# Patient Record
Sex: FEMALE | Race: BLACK OR AFRICAN AMERICAN | Employment: OTHER | URBAN - METROPOLITAN AREA
[De-identification: names, ages, dates, MRNs, and addresses within clinical notes are randomized per-mention and may not be internally consistent; named-entity substitution may affect disease eponyms.]

---

## 2018-10-24 ENCOUNTER — OFFICE VISIT (OUTPATIENT)
Dept: FAMILY MEDICINE CLINIC | Age: 63
End: 2018-10-24

## 2018-10-24 VITALS
SYSTOLIC BLOOD PRESSURE: 160 MMHG | TEMPERATURE: 98.1 F | RESPIRATION RATE: 18 BRPM | HEART RATE: 56 BPM | DIASTOLIC BLOOD PRESSURE: 81 MMHG | BODY MASS INDEX: 48.18 KG/M2 | OXYGEN SATURATION: 97 % | HEIGHT: 59 IN | WEIGHT: 239 LBS

## 2018-10-24 DIAGNOSIS — Z23 ENCOUNTER FOR IMMUNIZATION: ICD-10-CM

## 2018-10-24 DIAGNOSIS — Z00.00 WELL WOMAN EXAM (NO GYNECOLOGICAL EXAM): ICD-10-CM

## 2018-10-24 DIAGNOSIS — I10 ESSENTIAL HYPERTENSION: ICD-10-CM

## 2018-10-24 DIAGNOSIS — Z76.89 ESTABLISHING CARE WITH NEW DOCTOR, ENCOUNTER FOR: Primary | ICD-10-CM

## 2018-10-24 DIAGNOSIS — E66.01 OBESITY, MORBID (HCC): ICD-10-CM

## 2018-10-24 DIAGNOSIS — M17.0 PRIMARY OSTEOARTHRITIS OF BOTH KNEES: ICD-10-CM

## 2018-10-24 DIAGNOSIS — Z11.59 NEED FOR HEPATITIS C SCREENING TEST: ICD-10-CM

## 2018-10-24 RX ORDER — BISMUTH SUBSALICYLATE 262 MG
1 TABLET,CHEWABLE ORAL DAILY
COMMUNITY

## 2018-10-24 RX ORDER — NYSTATIN AND TRIAMCINOLONE ACETONIDE 100000; 1 [USP'U]/G; MG/G
CREAM TOPICAL
Refills: 2 | COMMUNITY
Start: 2018-10-19 | End: 2019-04-30

## 2018-10-24 RX ORDER — NYSTATIN 100000 [USP'U]/G
POWDER TOPICAL
Refills: 0 | COMMUNITY
Start: 2018-10-12 | End: 2019-04-30

## 2018-10-24 RX ORDER — HYDROCHLOROTHIAZIDE 25 MG/1
25 TABLET ORAL DAILY
Qty: 90 TAB | Refills: 1 | Status: SHIPPED | OUTPATIENT
Start: 2018-10-24 | End: 2019-04-06 | Stop reason: SDUPTHER

## 2018-10-24 NOTE — PROGRESS NOTES
Chief Complaint   Patient presents with    New Patient     establish care    Immunization/Injection     flu, shingles    Other     recent yeast inf. under breast     1. Have you been to the ER, urgent care clinic since your last visit? Hospitalized since your last visit? No    2. Have you seen or consulted any other health care providers outside of the [address] [place] [address] since your last visit? Include any pap smears or colon screening. Yes patient saw OBGYN Oct. 12 for yeast inf.  And had annual exam in Aug

## 2018-10-24 NOTE — PROGRESS NOTES
[facility] Note      Subjective:     Chief Complaint   Patient presents with    New Patient     establish care    Immunization/Injection     flu, shingles    Other     recent yeast inf. under breast     Franny Santos is a 66y.o. year old female who presents for evaluation of the following:    Establishment of Care:  Previous PCP: Dr. Bonnie Driscoll in [place], last seen 2018  Care Team:   Dentist- seen in past 1 year  Optho- seen in past 1 year. [facility] in [place]. GYN- Dr. Laura Nowak., [facility]. Last seen 10/2018  Ortho- [facility] in [address] [place] [address]    PMH:   History of HTN:   Diagnosed 5 years ago  Tx: None  Previous Tx: Diovan, discontinued by MD    History of Asthma Symptoms:   Told later by pulmonologist that she does not have asthma    Obesity:   \"It's too much but better than it was\"  Lost 50 lbs 2 years ago and regained all but 13lbs. Diet: Eating vegetable shake and egg for breakfast, lunch salad with grilled chicken, dinner friend chicken, fried fish, some pasta like spaghetti with ground turkey, mac n cheese,   - eats a lot of cake previously but cut down in past 1 month  Activity:  Walking    OA Knees, bilateral:   Previously getting knee injection   Requests ortho referral   Pain is occasional, not daily  No functional limitation related to knee pain      Acute Concerns:  None      Social:   Works- retired postal   Lives alone. No children  - moved to [place] t be near her niece who is also a patient here  Mood is \"great\"    Health Maintenance:   TDaP: Not on file but patient reports completed at unknown date  Influenza: Due  HPV: Not due  Pneumovax: Not due   Prevnar @65: Not due  Shingles @60: Advised patient request from pharmacy    Colonoscopy @50: Has had once in the past but date unknwon.  Due 2019 per patient  ASA @ 55f and 45m: Not taking  Dexa @65: Not due  HCV for  45-65: Due    HIV or other STD testing: Declined  Domestic Violence Screen: Negative  Depression Screen: Denies depression or anhedonia  PHQ over the last two weeks 10/24/2018   Little interest or pleasure in doing things Not at all   Feeling down, depressed, irritable, or hopeless Not at all   Total Score PHQ 2 0       Smoker? No    G0  Pap:  Last 5/2018, NIL per patient previous GYN in [address] [place] [address]: Last 2017. Records recently sent to GYN in [facility]  No LMP recorded. Patient is postmenopausal.    reports that she does not currently engage in sexual activity. Review of Systems   Pertinent positives and negative per HPI. All other systems  reviewed are negative for a Comprehensive ROS (10+). History reviewed. No pertinent past medical history. Social History     Socioeconomic History    Marital status: SINGLE     Spouse name: Not on file    Number of children: Not on file    Years of education: Not on file    Highest education level: Not on file   Social Needs    Financial resource strain: Not on file    Food insecurity - worry: Not on file    Food insecurity - inability: Not on file    Transportation needs - medical: Not on file   [employer] needs - non-medical: Not on file   Occupational History    Not on file   Tobacco Use    Smoking status: Never Smoker    Smokeless tobacco: Never Used   Substance and Sexual Activity    Alcohol use: Yes     Alcohol/week: 1.2 oz     Types: 1 Glasses of wine, 1 Cans of beer per week     Comment: occasinal    Drug use: No    Sexual activity: Not Currently   Other Topics Concern    Not on file   Social History Narrative    Not on file       Current Outpatient Medications   Medication Sig    nystatin (MYCOSTATIN) powder APPLY TO AFFECTED AREA TWICE A DAY    nystatin-triamcinolone (MYCOLOG II) topical cream APPLY TO AFFECTED AREA TWICE A DAY    multivitamin (ONE A DAY) tablet Take 1 Tab by mouth daily. No current facility-administered medications for this visit. Objective:     Vitals:    10/24/18 1351   BP: 160/81   Pulse: (!) 56   Resp: 18   Temp: 98.1 °F (36.7 °C)   TempSrc: Oral   SpO2: 97%   Weight: 239 lb (108.4 kg)   Height: 4' 11\" (1.499 m)       Physical Examination:  General: Alert, cooperative, no distress, appears stated age. Obese  Eyes: Conjunctivae clear. PERRL, EOMs intact. Ears: Normal external ear canals both ears. TM clear and mobile bilaterally  Nose: Nares normal. Septum midline. Mucosa normal. No drainage or sinus tenderness. Mouth/Throat: Lips, mucosa, and tongue normal.   Neck: Supple, symmetrical, trachea midline, no adenopathy. No thyroid enlargement/tenderness/nodules  Back: Symmetric, no curvature. ROM normal. No CVA tenderness. Lungs: Clear to auscultation bilaterally. Normal inspiratory and expiratory ratio. Heart: Regular rate and rhythm, S1, S2 normal, no murmur, click, rub or gallop. Abdomen: Soft, non-tender. Bowel sounds normal. No masses or organomegaly. Extremities: Extremities normal, atraumatic, no cyanosis or edema. Pulses: 2+ and symmetric all extremities. Skin: Skin color, texture, turgor normal. No rashes or lesions on exposed skin. Lymph nodes: Cervical, supraclavicular nodes normal.  Neurologic: CNII-XII intact. Strength 5/5 grossly. Sensation and reflexes normal throughout. No results found for any previous visit. Assessment/ Plan:   Diagnoses and all orders for this visit:    1. Establishing care with new doctor, encounter for    2. Well woman exam (no gynecological exam)  -     CBC WITH AUTOMATED DIFF  -     METABOLIC PANEL, COMPREHENSIVE  -     LIPID PANEL  -     TSH AND FREE T4  -     HEMOGLOBIN A1C WITH EAG    3. Obesity, morbid (HCC)  -     TSH AND FREE T4  -     HEMOGLOBIN A1C WITH EAG    4. Essential hypertension  -     hydroCHLOROthiazide (HYDRODIURIL) 25 mg tablet; Take 1 Tab by mouth daily. 5. Primary osteoarthritis of both knees  -     REFERRAL TO ORTHOPEDICS    6.  Need for hepatitis C screening test  -     HEPATITIS C AB    7. Encounter for immunization  -     INFLUENZA VIRUS VAC QUAD,SPLIT,PRESV FREE SYRINGE IM  -     DE IMMUNIZ ADMIN,1 SINGLE/COMB VAC/TOXOID  -     varicella zoster vaccine live (VARICELLA-ZOSTER VACINE LIVE) 19,400 unit/0.65 mL susr injection; 1 Vial by SubCUTAneous route once for 1 dose. Other orders  -     CVD REPORT      Previous records requested  Vaccines reviewed, flu shot given. Labs to eval end organ function. Hep C screen  Diet and lifestyle modification encouraged for weighs loss. HTN uncontrolled. Start HCTZ. Low salt diet encouraged. Referral provided as requested to orthopedist for OA knees. Educated patient on red flag symptoms to warrant return to clinic or emergency room visit. I have discussed the diagnosis with the patient and the intended plan as seen in the above orders. The patient has been offered or received an after-visit summary and questions were answered concerning future plans. I have discussed medication side effects and warnings with the patient as well. Follow-up Disposition:  Return in about 3 months (around 1/24/2019) for Follow Up.       Signed,    Amy Solis MD  10/24/2018

## 2018-10-24 NOTE — PATIENT INSTRUCTIONS
Weight Loss Tips:    Remember this is like a part time job so your motivation and commitment is key to your success. Use small plate only  Drink 2 liters (1/2 gallon) of water every day  1/2 of every meal is fruit or vegetable  Try meal prepping on Sunday with new different vegetables. Replace soda with diet soda or other zero sugar drinks (selter water just fine)  Start using the THREAT STREAM sonja for calorie counting. Goal 1800 calories per day  Start work out at least 5 days per week for 40 minutes. Consider [employer] training sonja for home exercises. Well Visit, Women 48 to 72: Care Instructions  Your Care Instructions    Physical exams can help you stay healthy. Your doctor has checked your overall health and may have suggested ways to take good care of yourself. He or she also may have recommended tests. At home, you can help prevent illness with healthy eating, regular exercise, and other steps. Follow-up care is a key part of your treatment and safety. Be sure to make and go to all appointments, and call your doctor if you are having problems. It's also a good idea to know your test results and keep a list of the medicines you take. How can you care for yourself at home? · Reach and stay at a healthy weight. This will lower your risk for many problems, such as obesity, diabetes, heart disease, and high blood pressure. · Get at least 30 minutes of exercise on most days of the week. Walking is a good choice. You also may want to do other activities, such as running, swimming, cycling, or playing tennis or team sports. · Do not smoke. Smoking can make health problems worse. If you need help quitting, talk to your doctor about stop-smoking programs and medicines. These can increase your chances of quitting for good. · Protect your skin from too much sun. When you're outdoors from 10 a.m. to 4 p.m., stay in the shade or cover up with clothing and a hat with a wide brim.  Wear sunglasses that block UV rays. Even when it's cloudy, put broad-spectrum sunscreen (SPF 30 or higher) on any exposed skin. · See a dentist one or two times a year for checkups and to have your teeth cleaned. · Wear a seat belt in the car. · Limit alcohol to 1 drink a day. Too much alcohol can cause health problems. Follow your doctor's advice about when to have certain tests. These tests can spot problems early. · Cholesterol. Your doctor will tell you how often to have this done based on your age, family history, or other things that can increase your risk for heart attack and stroke. · Blood pressure. Have your blood pressure checked during a routine doctor visit. Your doctor will tell you how often to check your blood pressure based on your age, your blood pressure results, and other factors. · Mammogram. Ask your doctor how often you should have a mammogram, which is an X-ray of your breasts. A mammogram can spot breast cancer before it can be felt and when it is easiest to treat. · Pap test and pelvic exam. Ask your doctor how often you should have a Pap test. You may not need to have a Pap test as often as you used to. · Vision. Have your eyes checked every year or two or as often as your doctor suggests. Some experts recommend that you have yearly exams for glaucoma and other age-related eye problems starting at age 48. · Hearing. Tell your doctor if you notice any change in your hearing. You can have tests to find out how well you hear. · Diabetes. Ask your doctor whether you should have tests for diabetes. · Colon cancer. You should begin tests for colon cancer at age 48. You may have one of several tests. Your doctor will tell you how often to have tests based on your age and risk. Risks include whether you already had a precancerous polyp removed from your colon or whether your parents, sisters and brothers, or children have had colon cancer. · Thyroid disease.  Talk to your doctor about whether to have your thyroid checked as part of a regular physical exam. Women have an increased chance of a thyroid problem. · Osteoporosis. You should begin tests for bone density at age 72. If you are younger than 72, ask your doctor whether you have factors that may increase your risk for this disease. You may want to have this test before age 72. · Heart attack and stroke risk. At least every 4 to 6 years, you should have your risk for heart attack and stroke assessed. Your doctor uses factors such as your age, blood pressure, cholesterol, and whether you smoke or have diabetes to show what your risk for a heart attack or stroke is over the next 10 years. When should you call for help? Watch closely for changes in your health, and be sure to contact your doctor if you have any problems or symptoms that concern you. Where can you learn more? Go to http://zach-medina.info/. Enter Z673 in the search box to learn more about \"Well Visit, Women 50 to 72: Care Instructions. \"  Current as of: March 29, 2018  Content Version: 11.8  © 3118-3348 [employer]. Care instructions adapted under license by [place] (which disclaims liability or warranty for this information). If you have questions about a medical condition or this instruction, always ask your healthcare professional. [address] any warranty or liability for your use of this information. DASH Diet: Care Instructions  Your Care Instructions    The DASH diet is an eating plan that can help lower your blood pressure. DASH stands for Dietary Approaches to Stop Hypertension. Hypertension is high blood pressure. The DASH diet focuses on eating foods that are high in calcium, potassium, and magnesium. These nutrients can lower blood pressure. The foods that are highest in these nutrients are fruits, vegetables, low-fat dairy products, nuts, seeds, and legumes.  But taking calcium, potassium, and magnesium supplements instead of eating foods that are high in those nutrients does not have the same effect. The DASH diet also includes whole grains, fish, and poultry. The DASH diet is one of several lifestyle changes your doctor may recommend to lower your high blood pressure. Your doctor may also want you to decrease the amount of sodium in your diet. Lowering sodium while following the DASH diet can lower blood pressure even further than just the DASH diet alone. Follow-up care is a key part of your treatment and safety. Be sure to make and go to all appointments, and call your doctor if you are having problems. It's also a good idea to know your test results and keep a list of the medicines you take. How can you care for yourself at home? Following the DASH diet  · Eat 4 to 5 servings of fruit each day. A serving is 1 medium-sized piece of fruit, ½ cup chopped or canned fruit, 1/4 cup dried fruit, or 4 ounces (½ cup) of fruit juice. Choose fruit more often than fruit juice. · Eat 4 to 5 servings of vegetables each day. A serving is 1 cup of lettuce or raw leafy vegetables, ½ cup of chopped or cooked vegetables, or 4 ounces (½ cup) of vegetable juice. Choose vegetables more often than vegetable juice. · Get 2 to 3 servings of low-fat and fat-free dairy each day. A serving is 8 ounces of milk, 1 cup of yogurt, or 1 ½ ounces of cheese. · Eat 6 to 8 servings of grains each day. A serving is 1 slice of bread, 1 ounce of dry cereal, or ½ cup of cooked rice, pasta, or cooked cereal. Try to choose whole-grain products as much as possible. · Limit lean meat, poultry, and fish to 2 servings each day. A serving is 3 ounces, about the size of a deck of cards. · Eat 4 to 5 servings of nuts, seeds, and legumes (cooked dried beans, lentils, and split peas) each week. A serving is 1/3 cup of nuts, 2 tablespoons of seeds, or ½ cup of cooked beans or peas. · Limit fats and oils to 2 to 3 servings each day.  A serving is 1 teaspoon of vegetable oil or 2 tablespoons of salad dressing. · Limit sweets and added sugars to 5 servings or less a week. A serving is 1 tablespoon jelly or jam, ½ cup sorbet, or 1 cup of lemonade. · Eat less than 2,300 milligrams (mg) of sodium a day. If you limit your sodium to 1,500 mg a day, you can lower your blood pressure even more. Tips for success  · Start small. Do not try to make dramatic changes to your diet all at once. You might feel that you are missing out on your favorite foods and then be more likely to not follow the plan. Make small changes, and stick with them. Once those changes become habit, add a few more changes. · Try some of the following:  ? Make it a goal to eat a fruit or vegetable at every meal and at snacks. This will make it easy to get the recommended amount of fruits and vegetables each day. ? Try yogurt topped with fruit and nuts for a snack or healthy dessert. ? Add lettuce, tomato, cucumber, and onion to sandwiches. ? Combine a ready-made pizza crust with low-fat mozzarella cheese and lots of vegetable toppings. Try using tomatoes, squash, spinach, broccoli, carrots, cauliflower, and onions. ? Have a variety of cut-up vegetables with a low-fat dip as an appetizer instead of chips and dip. ? Sprinkle sunflower seeds or chopped almonds over salads. Or try adding chopped walnuts or almonds to cooked vegetables. ? Try some vegetarian meals using beans and peas. Add garbanzo or kidney beans to salads. Make burritos and tacos with mashed abdalla beans or black beans. Where can you learn more? Go to http://zach-medina.info/. Enter I616 in the search box to learn more about \"DASH Diet: Care Instructions. \"  Current as of: December 6, 2017  Content Version: 11.8  © 3911-2889 [employer]. Care instructions adapted under license by [employer] (which disclaims liability or warranty for this information).  If you have questions about a medical condition or this instruction, always ask your healthcare professional. [place] [address] any warranty or liability for your use of this information.

## 2018-10-25 LAB
ALBUMIN SERPL-MCNC: 4.2 G/DL (ref 3.6–4.8)
ALBUMIN/GLOB SERPL: 1.3 {RATIO} (ref 1.2–2.2)
ALP SERPL-CCNC: 124 IU/L (ref 39–117)
ALT SERPL-CCNC: 14 IU/L (ref 0–32)
AST SERPL-CCNC: 20 IU/L (ref 0–40)
BASOPHILS # BLD AUTO: 0 X10E3/UL (ref 0–0.2)
BASOPHILS NFR BLD AUTO: 0 %
BILIRUB SERPL-MCNC: 0.6 MG/DL (ref 0–1.2)
BUN SERPL-MCNC: 17 MG/DL (ref 8–27)
BUN/CREAT SERPL: 24 (ref 12–28)
CALCIUM SERPL-MCNC: 9.8 MG/DL (ref 8.7–10.3)
CHLORIDE SERPL-SCNC: 99 MMOL/L (ref 96–106)
CHOLEST SERPL-MCNC: 205 MG/DL (ref 100–199)
CO2 SERPL-SCNC: 26 MMOL/L (ref 20–29)
CREAT SERPL-MCNC: 0.7 MG/DL (ref 0.57–1)
EOSINOPHIL # BLD AUTO: 0.1 X10E3/UL (ref 0–0.4)
EOSINOPHIL NFR BLD AUTO: 2 %
ERYTHROCYTE [DISTWIDTH] IN BLOOD BY AUTOMATED COUNT: 14.5 % (ref 12.3–15.4)
EST. AVERAGE GLUCOSE BLD GHB EST-MCNC: 120 MG/DL
GLOBULIN SER CALC-MCNC: 3.2 G/DL (ref 1.5–4.5)
GLUCOSE SERPL-MCNC: 83 MG/DL (ref 65–99)
HBA1C MFR BLD: 5.8 % (ref 4.8–5.6)
HCT VFR BLD AUTO: 37.4 % (ref 34–46.6)
HCV AB S/CO SERPL IA: <0.1 S/CO RATIO (ref 0–0.9)
HDLC SERPL-MCNC: 71 MG/DL
HGB BLD-MCNC: 12.5 G/DL (ref 11.1–15.9)
IMM GRANULOCYTES # BLD: 0 X10E3/UL (ref 0–0.1)
IMM GRANULOCYTES NFR BLD: 0 %
INTERPRETATION, 910389: NORMAL
LDLC SERPL CALC-MCNC: 117 MG/DL (ref 0–99)
LYMPHOCYTES # BLD AUTO: 2.1 X10E3/UL (ref 0.7–3.1)
LYMPHOCYTES NFR BLD AUTO: 37 %
MCH RBC QN AUTO: 31.6 PG (ref 26.6–33)
MCHC RBC AUTO-ENTMCNC: 33.4 G/DL (ref 31.5–35.7)
MCV RBC AUTO: 95 FL (ref 79–97)
MONOCYTES # BLD AUTO: 0.3 X10E3/UL (ref 0.1–0.9)
MONOCYTES NFR BLD AUTO: 5 %
NEUTROPHILS # BLD AUTO: 3.1 X10E3/UL (ref 1.4–7)
NEUTROPHILS NFR BLD AUTO: 56 %
PLATELET # BLD AUTO: 297 X10E3/UL (ref 150–379)
POTASSIUM SERPL-SCNC: 5.1 MMOL/L (ref 3.5–5.2)
PROT SERPL-MCNC: 7.4 G/DL (ref 6–8.5)
RBC # BLD AUTO: 3.95 X10E6/UL (ref 3.77–5.28)
SODIUM SERPL-SCNC: 140 MMOL/L (ref 134–144)
T4 FREE SERPL-MCNC: 1.21 NG/DL (ref 0.82–1.77)
TRIGL SERPL-MCNC: 83 MG/DL (ref 0–149)
TSH SERPL DL<=0.005 MIU/L-ACNC: 1.22 UIU/ML (ref 0.45–4.5)
VLDLC SERPL CALC-MCNC: 17 MG/DL (ref 5–40)
WBC # BLD AUTO: 5.6 X10E3/UL (ref 3.4–10.8)

## 2018-10-29 DIAGNOSIS — E78.5 HYPERLIPIDEMIA, UNSPECIFIED HYPERLIPIDEMIA TYPE: Primary | ICD-10-CM

## 2018-10-29 RX ORDER — GUAIFENESIN 100 MG/5ML
81 LIQUID (ML) ORAL DAILY
Qty: 90 TAB | Refills: 1 | Status: SHIPPED | OUTPATIENT
Start: 2018-10-29 | End: 2019-04-12 | Stop reason: SDUPTHER

## 2018-10-29 RX ORDER — ATORVASTATIN CALCIUM 20 MG/1
TABLET, FILM COATED ORAL
Qty: 90 TAB | Refills: 1 | Status: SHIPPED | OUTPATIENT
Start: 2018-10-29 | End: 2019-04-12 | Stop reason: SDUPTHER

## 2018-10-29 NOTE — PROGRESS NOTES
Outbound call to patient. Name and  verified. Reviewed recent labs with patient. She verbalized understanding. Letter printed with results.

## 2018-10-29 NOTE — PROGRESS NOTES
Notify Patient:  Most of your results are normal. Your cholesterol is higher than normal which increases your risk of heart attack and stroke. I recommend you start a daily aspirin 81 mg and start a cholesterol lowering medicine called atorvastatin. I will send this to your pharmacy. Your blood sugar level is in the PRE-diabetes range. This means you do not have diabetes but you could develop it later on. I would suggest you change your diet to exclude processed foods such as deli meat and hotdogs. You should also avoid bread, crackers, cakes, and cookies. Try replacing them with whole foods, like fruits and vegetables.        MD Note:  ASCVD Risk 11.9%

## 2018-11-06 PROBLEM — E04.2 MULTINODULAR GOITER: Status: ACTIVE | Noted: 2018-11-06

## 2018-11-06 PROBLEM — K29.30 CHRONIC SUPERFICIAL GASTRITIS WITHOUT BLEEDING: Status: ACTIVE | Noted: 2018-11-06

## 2018-11-07 ENCOUNTER — OFFICE VISIT (OUTPATIENT)
Dept: FAMILY MEDICINE CLINIC | Age: 63
End: 2018-11-07

## 2018-11-07 VITALS
OXYGEN SATURATION: 96 % | DIASTOLIC BLOOD PRESSURE: 86 MMHG | HEART RATE: 78 BPM | BODY MASS INDEX: 46.04 KG/M2 | WEIGHT: 228.4 LBS | TEMPERATURE: 98.2 F | HEIGHT: 59 IN | SYSTOLIC BLOOD PRESSURE: 138 MMHG | RESPIRATION RATE: 18 BRPM

## 2018-11-07 DIAGNOSIS — J06.9 VIRAL UPPER RESPIRATORY TRACT INFECTION: ICD-10-CM

## 2018-11-07 DIAGNOSIS — I10 ESSENTIAL HYPERTENSION: Primary | ICD-10-CM

## 2018-11-07 DIAGNOSIS — E04.2 MULTINODULAR GOITER: ICD-10-CM

## 2018-11-07 DIAGNOSIS — M17.0 PRIMARY OSTEOARTHRITIS OF BOTH KNEES: ICD-10-CM

## 2018-11-07 DIAGNOSIS — E66.01 OBESITY, MORBID (HCC): ICD-10-CM

## 2018-11-07 PROBLEM — D25.1 INTRAMURAL LEIOMYOMA OF UTERUS: Status: ACTIVE | Noted: 2018-11-07

## 2018-11-07 NOTE — PATIENT INSTRUCTIONS
For your symptoms: Your symptoms may improve with an oral antihistamine. These are available over the counter and include:  Loratadine/claritin  Cetirizine/Zyrtec  Fexofenadine/Allegra  Levocetirizine/Xyzal    · Your symptoms may improve with a nasal steroid. These are available over the counter and include:  · Flonase (aka fluticasone)  · Nasocort (aka triamcinolone)  · Nasonex (aka mometasone)  · Rhinocort (aka budesonide)    · Increase fluid intake, especially water to thin mucous and boost the immune system. · Avoid sugar and dairy while congested since they thicken mucous. · Get plenty of rest!    · Gargle 3 times daily and as needed in Listerine or warm salt water vinegar solutions (1 tsp salt, 1 tsp vinegar in 1 cup lukewarm water.)    · Use OTC nasal saline spray up each nostril four times daily. You could also consider using a netipot with distilled water. · Use humidifier at bedtime. · Use OTC Mucinex 600 mg twice daily to loosen mucous. · Use OTC Tylenol  (up to 650mg every 6 hours) or Ibuprofen (up to 800 mg every 8 hours) as needed for pain, fever or headaches. ·  Avoid decongestants and Ibuprofen if you have high blood pressure! Return to the doctor for evaluation:  · If mucous is consistently discolored yellow or green throughout the day for more than a week  · If you develop worsening facial pain  · If you develop a fever that will not go away  · If your symptoms worsen instead of improve           DASH Diet: Care Instructions  Your Care Instructions    The DASH diet is an eating plan that can help lower your blood pressure. DASH stands for Dietary Approaches to Stop Hypertension. Hypertension is high blood pressure. The DASH diet focuses on eating foods that are high in calcium, potassium, and magnesium. These nutrients can lower blood pressure. The foods that are highest in these nutrients are fruits, vegetables, low-fat dairy products, nuts, seeds, and legumes.  But taking calcium, potassium, and magnesium supplements instead of eating foods that are high in those nutrients does not have the same effect. The DASH diet also includes whole grains, fish, and poultry. The DASH diet is one of several lifestyle changes your doctor may recommend to lower your high blood pressure. Your doctor may also want you to decrease the amount of sodium in your diet. Lowering sodium while following the DASH diet can lower blood pressure even further than just the DASH diet alone. Follow-up care is a key part of your treatment and safety. Be sure to make and go to all appointments, and call your doctor if you are having problems. It's also a good idea to know your test results and keep a list of the medicines you take. How can you care for yourself at home? Following the DASH diet  · Eat 4 to 5 servings of fruit each day. A serving is 1 medium-sized piece of fruit, ½ cup chopped or canned fruit, 1/4 cup dried fruit, or 4 ounces (½ cup) of fruit juice. Choose fruit more often than fruit juice. · Eat 4 to 5 servings of vegetables each day. A serving is 1 cup of lettuce or raw leafy vegetables, ½ cup of chopped or cooked vegetables, or 4 ounces (½ cup) of vegetable juice. Choose vegetables more often than vegetable juice. · Get 2 to 3 servings of low-fat and fat-free dairy each day. A serving is 8 ounces of milk, 1 cup of yogurt, or 1 ½ ounces of cheese. · Eat 6 to 8 servings of grains each day. A serving is 1 slice of bread, 1 ounce of dry cereal, or ½ cup of cooked rice, pasta, or cooked cereal. Try to choose whole-grain products as much as possible. · Limit lean meat, poultry, and fish to 2 servings each day. A serving is 3 ounces, about the size of a deck of cards. · Eat 4 to 5 servings of nuts, seeds, and legumes (cooked dried beans, lentils, and split peas) each week. A serving is 1/3 cup of nuts, 2 tablespoons of seeds, or ½ cup of cooked beans or peas.   · Limit fats and oils to 2 to 3 servings each day. A serving is 1 teaspoon of vegetable oil or 2 tablespoons of salad dressing. · Limit sweets and added sugars to 5 servings or less a week. A serving is 1 tablespoon jelly or jam, ½ cup sorbet, or 1 cup of lemonade. · Eat less than 2,300 milligrams (mg) of sodium a day. If you limit your sodium to 1,500 mg a day, you can lower your blood pressure even more. Tips for success  · Start small. Do not try to make dramatic changes to your diet all at once. You might feel that you are missing out on your favorite foods and then be more likely to not follow the plan. Make small changes, and stick with them. Once those changes become habit, add a few more changes. · Try some of the following:  ? Make it a goal to eat a fruit or vegetable at every meal and at snacks. This will make it easy to get the recommended amount of fruits and vegetables each day. ? Try yogurt topped with fruit and nuts for a snack or healthy dessert. ? Add lettuce, tomato, cucumber, and onion to sandwiches. ? Combine a ready-made pizza crust with low-fat mozzarella cheese and lots of vegetable toppings. Try using tomatoes, squash, spinach, broccoli, carrots, cauliflower, and onions. ? Have a variety of cut-up vegetables with a low-fat dip as an appetizer instead of chips and dip. ? Sprinkle sunflower seeds or chopped almonds over salads. Or try adding chopped walnuts or almonds to cooked vegetables. ? Try some vegetarian meals using beans and peas. Add garbanzo or kidney beans to salads. Make burritos and tacos with mashed abdalla beans or black beans. Where can you learn more? Go to http://zach-medina.info/. Enter L379 in the search box to learn more about \"DASH Diet: Care Instructions. \"  Current as of: December 6, 2017  Content Version: 11.8  © 0098-1845 [employer].  Care instructions adapted under license by [employer] (which disclaims liability or warranty for this information). If you have questions about a medical condition or this instruction, always ask your healthcare professional. [place] [address] any warranty or liability for your use of this information.

## 2018-11-07 NOTE — PROGRESS NOTES
[facility] Note      Subjective:     Chief Complaint   Patient presents with    Hypertension     Patient was started on new blood pressure medication     Rudolph Damon is a 66y.o. year old female who presents for evaluation of the following:      PMH:   HTN:   Tx: HCTZ 25mg daily  Eating more vegetables, no candy for halloweeen  - not eating chips, eating at sisters house less often  Wt down 9 lbs     Obesity:   \"It's too much but better than it was\"  Previous Attempts:Lost 50 lbs 2 years ago and regained all but 13lbs. Diet: Eating vegetable shakes, more vegetables with meals  Activity:  Walking  Wt 239>228    OA Knees, bilateral:   Tx: ASA 81 daily for ASCVD prevention  Previously Tx: getting knee injection   No functional limitation related to knee pain  Has not seen orthopedists since knee pain improved    Cough:   Dry, stuffy nose  Onset 1 week ago  No rhinorrhea  Endorses history f allergies  Denies sinus congestion, fever, chest pain, shortness of breath. History multinodular Goiter  In 20123 per outside records. TSH/ fT4 WNL in 10/2018      Social:   Works- retired [employer]   Lives alone. No children  - moved to [place] to be near her niece who is also a patient here        Review of Systems   Pertinent positives and negative per HPI. All other systems  reviewed are negative for a Comprehensive ROS (10+). History reviewed. No pertinent past medical history.      Social History     Socioeconomic History    Marital status: SINGLE     Spouse name: Not on file    Number of children: Not on file    Years of education: Not on file    Highest education level: Not on file   Social Needs    Financial resource strain: Not on file    Food insecurity - worry: Not on file    Food insecurity - inability: Not on file    Transportation needs - medical: Not on file   [employer] needs - non-medical: Not on file   Occupational History    Not on file Tobacco Use    Smoking status: Never Smoker    Smokeless tobacco: Never Used   Substance and Sexual Activity    Alcohol use: Yes     Alcohol/week: 1.2 oz     Types: 1 Glasses of wine, 1 Cans of beer per week     Comment: occasinal    Drug use: No    Sexual activity: Not Currently   Other Topics Concern    Not on file   Social History Narrative    Not on file       Current Outpatient Medications   Medication Sig    atorvastatin (LIPITOR) 20 mg tablet Take 1/2 tab daily for 1 week; if tolerated take 1 tab daily.  aspirin 81 mg chewable tablet Take 1 Tab by mouth daily.  multivitamin (ONE A DAY) tablet Take 1 Tab by mouth daily.  hydroCHLOROthiazide (HYDRODIURIL) 25 mg tablet Take 1 Tab by mouth daily.  nystatin (MYCOSTATIN) powder APPLY TO AFFECTED AREA TWICE A DAY    nystatin-triamcinolone (MYCOLOG II) topical cream APPLY TO AFFECTED AREA TWICE A DAY     No current facility-administered medications for this visit. Objective:     Vitals:    11/07/18 0812 11/07/18 0829   BP: 143/78 138/86   Pulse: 78    Resp: 18    Temp: 98.2 °F (36.8 °C)    TempSrc: Oral    SpO2: 96%    Weight: 228 lb 6.4 oz (103.6 kg)    Height: 4' 11\" (1.499 m)        Physical Examination:  General: Alert, cooperative, no distress, appears stated age. Obese  Eyes: Conjunctivae clear. PERRL, EOMs intact. Ears: Normal external ear canals both ears. Nose: Nares normal. Septum midline. Mucosa normal. No drainage or sinus tenderness. Mouth/Throat: Lips, mucosa, and tongue normal.   Neck: Supple, symmetrical, trachea midline, no adenopathy. No thyroid enlargement/tenderness/nodules  Back: Symmetric, no curvature. ROM normal. No CVA tenderness. Lungs: Clear to auscultation bilaterally. Normal inspiratory and expiratory ratio. Heart: Regular rate and rhythm, S1, S2 normal, no murmur, click, rub or gallop. Abdomen: Soft, non-tender. Extremities: Extremities normal, atraumatic, no cyanosis or edema.   Pulses: 2+ and symmetric all extremities. Skin: Skin color, texture, turgor normal. No rashes or lesions on exposed skin. Lymph nodes: Cervical, supraclavicular nodes normal.  Neurologic: CNII-XII intact. Office Visit on 10/24/2018   Component Date Value Ref Range Status    WBC 10/24/2018 5.6  3.4 - 10.8 x10E3/uL Final    RBC 10/24/2018 3.95  3.77 - 5.28 x10E6/uL Final    HGB 10/24/2018 12.5  11.1 - 15.9 g/dL Final    HCT 10/24/2018 37.4  34.0 - 46.6 % Final    MCV 10/24/2018 95  79 - 97 fL Final    MCH 10/24/2018 31.6  26.6 - 33.0 pg Final    MCHC 10/24/2018 33.4  31.5 - 35.7 g/dL Final    RDW 10/24/2018 14.5  12.3 - 15.4 % Final    PLATELET 27/94/6529 510  150 - 379 x10E3/uL Final    NEUTROPHILS 10/24/2018 56  Not Estab. % Final    Lymphocytes 10/24/2018 37  Not Estab. % Final    MONOCYTES 10/24/2018 5  Not Estab. % Final    EOSINOPHILS 10/24/2018 2  Not Estab. % Final    BASOPHILS 10/24/2018 0  Not Estab. % Final    ABS. NEUTROPHILS 10/24/2018 3.1  1.4 - 7.0 x10E3/uL Final    Abs Lymphocytes 10/24/2018 2.1  0.7 - 3.1 x10E3/uL Final    ABS. MONOCYTES 10/24/2018 0.3  0.1 - 0.9 x10E3/uL Final    ABS. EOSINOPHILS 10/24/2018 0.1  0.0 - 0.4 x10E3/uL Final    ABS. BASOPHILS 10/24/2018 0.0  0.0 - 0.2 x10E3/uL Final    IMMATURE GRANULOCYTES 10/24/2018 0  Not Estab. % Final    ABS. IMM. GRANS.  10/24/2018 0.0  0.0 - 0.1 x10E3/uL Final    Glucose 10/24/2018 83  65 - 99 mg/dL Final    BUN 10/24/2018 17  8 - 27 mg/dL Final    Creatinine 10/24/2018 0.70  0.57 - 1.00 mg/dL Final    GFR est non-AA 10/24/2018 94  >59 mL/min/1.73 Final    GFR est AA 10/24/2018 108  >59 mL/min/1.73 Final    BUN/Creatinine ratio 10/24/2018 24  12 - 28 Final    Sodium 10/24/2018 140  134 - 144 mmol/L Final    Potassium 10/24/2018 5.1  3.5 - 5.2 mmol/L Final    Chloride 10/24/2018 99  96 - 106 mmol/L Final    CO2 10/24/2018 26  20 - 29 mmol/L Final    Calcium 10/24/2018 9.8  8.7 - 10.3 mg/dL Final    Protein, total 10/24/2018 7.4  6.0 - 8.5 g/dL Final    Albumin 10/24/2018 4.2  3.6 - 4.8 g/dL Final    GLOBULIN, TOTAL 10/24/2018 3.2  1.5 - 4.5 g/dL Final    A-G Ratio 10/24/2018 1.3  1.2 - 2.2 Final    Bilirubin, total 10/24/2018 0.6  0.0 - 1.2 mg/dL Final    Alk. phosphatase 10/24/2018 124* 39 - 117 IU/L Final    AST (SGOT) 10/24/2018 20  0 - 40 IU/L Final    ALT (SGPT) 10/24/2018 14  0 - 32 IU/L Final    Cholesterol, total 10/24/2018 205* 100 - 199 mg/dL Final    Triglyceride 10/24/2018 83  0 - 149 mg/dL Final    HDL Cholesterol 10/24/2018 71  >39 mg/dL Final    VLDL, calculated 10/24/2018 17  5 - 40 mg/dL Final    LDL, calculated 10/24/2018 117* 0 - 99 mg/dL Final    TSH 10/24/2018 1.220  0.450 - 4.500 uIU/mL Final    T4, Free 10/24/2018 1.21  0.82 - 1.77 ng/dL Final    Hemoglobin A1c 10/24/2018 5.8* 4.8 - 5.6 % Final    Comment:          Prediabetes: 5.7 - 6.4           Diabetes: >6.4           Glycemic control for adults with diabetes: <7.0      Estimated average glucose 10/24/2018 120  mg/dL Final    Hep C Virus Ab 10/24/2018 <0.1  0.0 - 0.9 s/co ratio Final    Comment:                                   Negative:     < 0.8                               Indeterminate: 0.8 - 0.9                                    Positive:     > 0.9   The CDC recommends that a positive HCV antibody result   be followed up with a HCV Nucleic Acid Amplification   test ([id number]).  INTERPRETATION 10/24/2018 Note   Final    Supplemental report is available. Assessment/ Plan:   Diagnoses and all orders for this visit:    1. Essential hypertension    2. Obesity, morbid ([address]    3. Multinodular goiter    4. Primary osteoarthritis of both knees      HTN well controlled. Continue HCTZ. Low salt diet encouraged. Diet and lifestyle modification encouraged for weighs loss. Positive reinforcement provided for 9 pound weight loss  History of multinodular goiter with normal thyroid function testing, asymptomatic.   OA well-controlled with daily low-dose aspirin. Orthopedist referral provided at last visit. Mild URI. No SIRS. Trial of otc meds for symptom relief discussed and listed in patient instructions- nasal steroid + mucinex + antihistamine + sinus rinse + otc analgesia + humidifier prn      Educated patient on red flag symptoms to warrant return to clinic or emergency room visit. I have discussed the diagnosis with the patient and the intended plan as seen in the above orders. The patient has been offered or received an after-visit summary and questions were answered concerning future plans. I have discussed medication side effects and warnings with the patient as well. Follow-up Disposition:  Return in about 3 months (around 2/7/2019) for Follow Up.       Signed,    Hill Villatoro MD  11/7/2018

## 2018-11-07 NOTE — PROGRESS NOTES
Chief Complaint   Patient presents with    Hypertension     Patient was started on new blood pressure medication     1. Have you been to the ER, urgent care clinic since your last visit? Hospitalized since your last visit? No    2. Have you seen or consulted any other health care providers outside of the [address] [place] [address] since your last visit? Include any pap smears or colon screening.  No

## 2019-02-06 ENCOUNTER — OFFICE VISIT (OUTPATIENT)
Dept: FAMILY MEDICINE CLINIC | Age: 64
End: 2019-02-06

## 2019-02-06 VITALS
OXYGEN SATURATION: 96 % | BODY MASS INDEX: 44.76 KG/M2 | WEIGHT: 222 LBS | RESPIRATION RATE: 16 BRPM | HEART RATE: 60 BPM | HEIGHT: 59 IN | DIASTOLIC BLOOD PRESSURE: 74 MMHG | TEMPERATURE: 98.1 F | SYSTOLIC BLOOD PRESSURE: 128 MMHG

## 2019-02-06 DIAGNOSIS — I10 ESSENTIAL HYPERTENSION: Primary | ICD-10-CM

## 2019-02-06 DIAGNOSIS — E66.01 OBESITY, MORBID (HCC): ICD-10-CM

## 2019-02-06 NOTE — PROGRESS NOTES
Chief Complaint   Patient presents with    Hypertension     follow up      1. Have you been to the ER, urgent care clinic since your last visit? Hospitalized since your last visit? No    2. Have you seen or consulted any other health care providers outside of the [address] [place] [address] since your last visit? Include any pap smears or colon screening. Yes When: [facility]  1/29/19 opthomology

## 2019-02-06 NOTE — PATIENT INSTRUCTIONS
Weight Loss Tips:    Remember this is like a part time job so your motivation and commitment is key to your success. Use small plate only  Drink 2 liters (1/2 gallon) of water every day  1/2 of every meal is fruit or vegetable  Try meal prepping on Sunday with new different vegetables. Replace soda with diet soda or other zero sugar drinks (selter water just fine)  Start using the [employer] sonja for calorie counting. Goal 1800 calories per day  Start work out at least 5 days per week for 40 minutes. Consider Sw[employer] training sonja for home exercises. DASH Diet: Care Instructions  Your Care Instructions    The DASH diet is an eating plan that can help lower your blood pressure. DASH stands for Dietary Approaches to Stop Hypertension. Hypertension is high blood pressure. The DASH diet focuses on eating foods that are high in calcium, potassium, and magnesium. These nutrients can lower blood pressure. The foods that are highest in these nutrients are fruits, vegetables, low-fat dairy products, nuts, seeds, and legumes. But taking calcium, potassium, and magnesium supplements instead of eating foods that are high in those nutrients does not have the same effect. The DASH diet also includes whole grains, fish, and poultry. The DASH diet is one of several lifestyle changes your doctor may recommend to lower your high blood pressure. Your doctor may also want you to decrease the amount of sodium in your diet. Lowering sodium while following the DASH diet can lower blood pressure even further than just the DASH diet alone. Follow-up care is a key part of your treatment and safety. Be sure to make and go to all appointments, and call your doctor if you are having problems. It's also a good idea to know your test results and keep a list of the medicines you take. How can you care for yourself at home? Following the DASH diet  · Eat 4 to 5 servings of fruit each day.  A serving is 1 medium-sized piece of fruit, ½ cup chopped or canned fruit, 1/4 cup dried fruit, or 4 ounces (½ cup) of fruit juice. Choose fruit more often than fruit juice. · Eat 4 to 5 servings of vegetables each day. A serving is 1 cup of lettuce or raw leafy vegetables, ½ cup of chopped or cooked vegetables, or 4 ounces (½ cup) of vegetable juice. Choose vegetables more often than vegetable juice. · Get 2 to 3 servings of low-fat and fat-free dairy each day. A serving is 8 ounces of milk, 1 cup of yogurt, or 1 ½ ounces of cheese. · Eat 6 to 8 servings of grains each day. A serving is 1 slice of bread, 1 ounce of dry cereal, or ½ cup of cooked rice, pasta, or cooked cereal. Try to choose whole-grain products as much as possible. · Limit lean meat, poultry, and fish to 2 servings each day. A serving is 3 ounces, about the size of a deck of cards. · Eat 4 to 5 servings of nuts, seeds, and legumes (cooked dried beans, lentils, and split peas) each week. A serving is 1/3 cup of nuts, 2 tablespoons of seeds, or ½ cup of cooked beans or peas. · Limit fats and oils to 2 to 3 servings each day. A serving is 1 teaspoon of vegetable oil or 2 tablespoons of salad dressing. · Limit sweets and added sugars to 5 servings or less a week. A serving is 1 tablespoon jelly or jam, ½ cup sorbet, or 1 cup of lemonade. · Eat less than 2,300 milligrams (mg) of sodium a day. If you limit your sodium to 1,500 mg a day, you can lower your blood pressure even more. Tips for success  · Start small. Do not try to make dramatic changes to your diet all at once. You might feel that you are missing out on your favorite foods and then be more likely to not follow the plan. Make small changes, and stick with them. Once those changes become habit, add a few more changes. · Try some of the following:  ? Make it a goal to eat a fruit or vegetable at every meal and at snacks. This will make it easy to get the recommended amount of fruits and vegetables each day.   ? Try yogurt topped with fruit and nuts for a snack or healthy dessert. ? Add lettuce, tomato, cucumber, and onion to sandwiches. ? Combine a ready-made pizza crust with low-fat mozzarella cheese and lots of vegetable toppings. Try using tomatoes, squash, spinach, broccoli, carrots, cauliflower, and onions. ? Have a variety of cut-up vegetables with a low-fat dip as an appetizer instead of chips and dip. ? Sprinkle sunflower seeds or chopped almonds over salads. Or try adding chopped walnuts or almonds to cooked vegetables. ? Try some vegetarian meals using beans and peas. Add garbanzo or kidney beans to salads. Make burritos and tacos with mashed abdalla beans or black beans. Where can you learn more? Go to http://zach-medina.info/. Enter I371 in the search box to learn more about \"DASH Diet: Care Instructions. \"  Current as of: July 22, 2018  Content Version: 11.9  © 0485-8083 [employer]. Care instructions adapted under license by [employer] (which disclaims liability or warranty for this information). If you have questions about a medical condition or this instruction, always ask your healthcare professional. [address] any warranty or liability for your use of this information.

## 2019-02-06 NOTE — PROGRESS NOTES
[facility] Note      Subjective:     Chief Complaint   Patient presents with    Hypertension     follow up      Christianne Garsia is a 66y.o. year old female who presents for evaluation of the following:      PMH:   HTN:   Tx: HCTZ 25mg daily  No home monitoring. Eating more vegetables  - not eating chips, eating at sisters house less often  Wt down another 6 lbs     Obesity:   BMI 44  Previous Attempts:Lost 50 lbs 2 years ago and regained all but 13lbs. Diet: Eating vegetable shakes, more vegetables with meals  Activity:  Walking  Wt 132>803>714       Social:   Works- retired postal   Lives alone. No children  - moved to [place] to be near her niece who is also a patient here        Review of Systems   Pertinent positives and negative per HPI. All other systems  reviewed are negative for a Comprehensive ROS (10+). History reviewed. No pertinent past medical history. Social History     Socioeconomic History    Marital status: SINGLE     Spouse name: Not on file    Number of children: Not on file    Years of education: Not on file    Highest education level: Not on file   Social Needs    Financial resource strain: Not on file    Food insecurity - worry: Not on file    Food insecurity - inability: Not on file    Transportation needs - medical: Not on file   [employer] needs - non-medical: Not on file   Occupational History    Not on file   Tobacco Use    Smoking status: Never Smoker    Smokeless tobacco: Never Used   Substance and Sexual Activity    Alcohol use:  Yes     Alcohol/week: 1.2 oz     Types: 1 Glasses of wine, 1 Cans of beer per week     Comment: occasinal    Drug use: No    Sexual activity: Not Currently   Other Topics Concern    Not on file   Social History Narrative    Not on file       Current Outpatient Medications   Medication Sig    atorvastatin (LIPITOR) 20 mg tablet Take 1/2 tab daily for 1 week; if tolerated take 1 tab daily.  aspirin 81 mg chewable tablet Take 1 Tab by mouth daily.  multivitamin (ONE A DAY) tablet Take 1 Tab by mouth daily.  hydroCHLOROthiazide (HYDRODIURIL) 25 mg tablet Take 1 Tab by mouth daily.  nystatin (MYCOSTATIN) powder APPLY TO AFFECTED AREA TWICE A DAY    nystatin-triamcinolone (MYCOLOG II) topical cream APPLY TO AFFECTED AREA TWICE A DAY     No current facility-administered medications for this visit. Objective:     Vitals:    02/06/19 0807 02/06/19 0811   BP:  128/74   Pulse:  60   Resp:  16   Temp:  98.1 °F (36.7 °C)   TempSrc:  Oral   SpO2:  96%   Weight: 222 lb (100.7 kg)    Height: 4' 11\" (1.499 m)        Physical Examination:  General: Alert, cooperative, no distress, appears stated age. Obese  Eyes: Conjunctivae clear. Ears: Normal external ear canals both ears. Nose: Nares normal.   Mouth/Throat: Lips, mucosa, and tongue normal.   Neck: Supple, symmetrical, trachea midline, no adenopathy. No thyroid enlargement/tenderness/nodules  Back: Symmetric, no curvature. Lungs: Clear to auscultation bilaterally. Normal inspiratory and expiratory ratio. Heart: Regular rate and rhythm, S1, S2 normal, no murmur, click, rub or gallop. Abdomen: Soft, non-tender. Extremities: Extremities normal, atraumatic, no cyanosis or edema. Skin: Skin color, texture, turgor normal. No rashes or lesions on exposed skin. Lymph nodes: Cervical, supraclavicular nodes normal.  Neurologic: CNII-XII intact. No visits with results within 3 Month(s) from this visit.    Latest known visit with results is:   Office Visit on 10/24/2018   Component Date Value Ref Range Status    WBC 10/24/2018 5.6  3.4 - 10.8 x10E3/uL Final    RBC 10/24/2018 3.95  3.77 - 5.28 x10E6/uL Final    HGB 10/24/2018 12.5  11.1 - 15.9 g/dL Final    HCT 10/24/2018 37.4  34.0 - 46.6 % Final    MCV 10/24/2018 95  79 - 97 fL Final    MCH 10/24/2018 31.6  26.6 - 33.0 pg Final    MCHC 10/24/2018 33.4  31.5 - 35.7 g/dL Final    RDW 10/24/2018 14.5  12.3 - 15.4 % Final    PLATELET 65/43/4046 393  150 - 379 x10E3/uL Final    NEUTROPHILS 10/24/2018 56  Not Estab. % Final    Lymphocytes 10/24/2018 37  Not Estab. % Final    MONOCYTES 10/24/2018 5  Not Estab. % Final    EOSINOPHILS 10/24/2018 2  Not Estab. % Final    BASOPHILS 10/24/2018 0  Not Estab. % Final    ABS. NEUTROPHILS 10/24/2018 3.1  1.4 - 7.0 x10E3/uL Final    Abs Lymphocytes 10/24/2018 2.1  0.7 - 3.1 x10E3/uL Final    ABS. MONOCYTES 10/24/2018 0.3  0.1 - 0.9 x10E3/uL Final    ABS. EOSINOPHILS 10/24/2018 0.1  0.0 - 0.4 x10E3/uL Final    ABS. BASOPHILS 10/24/2018 0.0  0.0 - 0.2 x10E3/uL Final    IMMATURE GRANULOCYTES 10/24/2018 0  Not Estab. % Final    ABS. IMM. GRANS. 10/24/2018 0.0  0.0 - 0.1 x10E3/uL Final    Glucose 10/24/2018 83  65 - 99 mg/dL Final    BUN 10/24/2018 17  8 - 27 mg/dL Final    Creatinine 10/24/2018 0.70  0.57 - 1.00 mg/dL Final    GFR est non-AA 10/24/2018 94  >59 mL/min/1.73 Final    GFR est AA 10/24/2018 108  >59 mL/min/1.73 Final    BUN/Creatinine ratio 10/24/2018 24  12 - 28 Final    Sodium 10/24/2018 140  134 - 144 mmol/L Final    Potassium 10/24/2018 5.1  3.5 - 5.2 mmol/L Final    Chloride 10/24/2018 99  96 - 106 mmol/L Final    CO2 10/24/2018 26  20 - 29 mmol/L Final    Calcium 10/24/2018 9.8  8.7 - 10.3 mg/dL Final    Protein, total 10/24/2018 7.4  6.0 - 8.5 g/dL Final    Albumin 10/24/2018 4.2  3.6 - 4.8 g/dL Final    GLOBULIN, TOTAL 10/24/2018 3.2  1.5 - 4.5 g/dL Final    A-G Ratio 10/24/2018 1.3  1.2 - 2.2 Final    Bilirubin, total 10/24/2018 0.6  0.0 - 1.2 mg/dL Final    Alk.  phosphatase 10/24/2018 124* 39 - 117 IU/L Final    AST (SGOT) 10/24/2018 20  0 - 40 IU/L Final    ALT (SGPT) 10/24/2018 14  0 - 32 IU/L Final    Cholesterol, total 10/24/2018 205* 100 - 199 mg/dL Final    Triglyceride 10/24/2018 83  0 - 149 mg/dL Final    HDL Cholesterol 10/24/2018 71  >39 mg/dL Final    VLDL, calculated 10/24/2018 17 5 - 40 mg/dL Final    LDL, calculated 10/24/2018 117* 0 - 99 mg/dL Final    TSH 10/24/2018 1.220  0.450 - 4.500 uIU/mL Final    T4, Free 10/24/2018 1.21  0.82 - 1.77 ng/dL Final    Hemoglobin A1c 10/24/2018 5.8* 4.8 - 5.6 % Final    Comment:          Prediabetes: 5.7 - 6.4           Diabetes: >6.4           Glycemic control for adults with diabetes: <7.0      Estimated average glucose 10/24/2018 120  mg/dL Final    Hep C Virus Ab 10/24/2018 <0.1  0.0 - 0.9 s/co ratio Final    Comment:                                   Negative:     < 0.8                               Indeterminate: 0.8 - 0.9                                    Positive:     > 0.9   The CDC recommends that a positive HCV antibody result   be followed up with a HCV Nucleic Acid Amplification   test (880568).  INTERPRETATION 10/24/2018 Note   Final    Supplemental report is available. Assessment/ Plan:   Diagnoses and all orders for this visit:    1. Essential hypertension    2. Obesity, morbid ([address]          HTN well controlled. Continue HCTZ. Low salt diet encouraged. Diet and lifestyle modification encouraged for weighs loss. Positive reinforcement provided for 9 pound weight loss      I have discussed the diagnosis with the patient and the intended plan as seen in the above orders. The patient has been offered or received an after-visit summary and questions were answered concerning future plans. I have discussed medication side effects and warnings with the patient as well. Follow-up Disposition:  Return in about 3 months (around 5/6/2019) for Follow Up HTN, weight.       Signed,    Noelle Chen MD  2/6/2019

## 2019-04-06 DIAGNOSIS — I10 ESSENTIAL HYPERTENSION: ICD-10-CM

## 2019-04-08 RX ORDER — HYDROCHLOROTHIAZIDE 25 MG/1
TABLET ORAL
Qty: 90 TAB | Refills: 1 | Status: SHIPPED | OUTPATIENT
Start: 2019-04-08 | End: 2019-10-11 | Stop reason: SDUPTHER

## 2019-04-12 DIAGNOSIS — E78.5 HYPERLIPIDEMIA, UNSPECIFIED HYPERLIPIDEMIA TYPE: ICD-10-CM

## 2019-04-15 RX ORDER — GUAIFENESIN 100 MG/5ML
LIQUID (ML) ORAL
Qty: 90 TAB | Refills: 1 | Status: SHIPPED | OUTPATIENT
Start: 2019-04-15 | End: 2019-10-11 | Stop reason: SDUPTHER

## 2019-04-15 RX ORDER — ATORVASTATIN CALCIUM 20 MG/1
TABLET, FILM COATED ORAL
Qty: 90 TAB | Refills: 1 | Status: SHIPPED | OUTPATIENT
Start: 2019-04-15 | End: 2019-10-11 | Stop reason: SDUPTHER

## 2019-04-30 ENCOUNTER — TELEPHONE (OUTPATIENT)
Dept: FAMILY MEDICINE CLINIC | Age: 64
End: 2019-04-30

## 2019-04-30 ENCOUNTER — OFFICE VISIT (OUTPATIENT)
Dept: FAMILY MEDICINE CLINIC | Age: 64
End: 2019-04-30

## 2019-04-30 VITALS
BODY MASS INDEX: 45.4 KG/M2 | HEART RATE: 61 BPM | RESPIRATION RATE: 17 BRPM | TEMPERATURE: 97.8 F | OXYGEN SATURATION: 100 % | DIASTOLIC BLOOD PRESSURE: 81 MMHG | HEIGHT: 59 IN | SYSTOLIC BLOOD PRESSURE: 137 MMHG | WEIGHT: 225.2 LBS

## 2019-04-30 DIAGNOSIS — E78.49 OTHER HYPERLIPIDEMIA: ICD-10-CM

## 2019-04-30 DIAGNOSIS — I10 ESSENTIAL HYPERTENSION: Primary | ICD-10-CM

## 2019-04-30 DIAGNOSIS — E66.01 OBESITY, MORBID (HCC): ICD-10-CM

## 2019-04-30 NOTE — TELEPHONE ENCOUNTER
Castro from [facility] is calling to have an auth sheet sent to them with the patient signature on the form so that they can proceed with the process.        Fax# [phone] to send back forms

## 2019-04-30 NOTE — PATIENT INSTRUCTIONS
Weight Loss Tips:    Remember this is like a part time job so your motivation and commitment is key to your success. Use small plate only  Drink 2 liters (1/2 gallon) of water every day  1/2 of every meal is fruit or vegetable  Try meal prepping on Sunday with new different vegetables. Replace soda with diet soda or other zero sugar drinks (selter water just fine)  Start using the [employer] sonja for calorie counting. Goal 1800 calories per day  Start work out at least 5 days per week for 40 minutes. Consider [employer] training sonja for home exercises. DASH Diet: Care Instructions  Your Care Instructions    The DASH diet is an eating plan that can help lower your blood pressure. DASH stands for Dietary Approaches to Stop Hypertension. Hypertension is high blood pressure. The DASH diet focuses on eating foods that are high in calcium, potassium, and magnesium. These nutrients can lower blood pressure. The foods that are highest in these nutrients are fruits, vegetables, low-fat dairy products, nuts, seeds, and legumes. But taking calcium, potassium, and magnesium supplements instead of eating foods that are high in those nutrients does not have the same effect. The DASH diet also includes whole grains, fish, and poultry. The DASH diet is one of several lifestyle changes your doctor may recommend to lower your high blood pressure. Your doctor may also want you to decrease the amount of sodium in your diet. Lowering sodium while following the DASH diet can lower blood pressure even further than just the DASH diet alone. Follow-up care is a key part of your treatment and safety. Be sure to make and go to all appointments, and call your doctor if you are having problems. It's also a good idea to know your test results and keep a list of the medicines you take. How can you care for yourself at home? Following the DASH diet  · Eat 4 to 5 servings of fruit each day.  A serving is 1 medium-sized piece of fruit, ½ cup chopped or canned fruit, 1/4 cup dried fruit, or 4 ounces (½ cup) of fruit juice. Choose fruit more often than fruit juice. · Eat 4 to 5 servings of vegetables each day. A serving is 1 cup of lettuce or raw leafy vegetables, ½ cup of chopped or cooked vegetables, or 4 ounces (½ cup) of vegetable juice. Choose vegetables more often than vegetable juice. · Get 2 to 3 servings of low-fat and fat-free dairy each day. A serving is 8 ounces of milk, 1 cup of yogurt, or 1 ½ ounces of cheese. · Eat 6 to 8 servings of grains each day. A serving is 1 slice of bread, 1 ounce of dry cereal, or ½ cup of cooked rice, pasta, or cooked cereal. Try to choose whole-grain products as much as possible. · Limit lean meat, poultry, and fish to 2 servings each day. A serving is 3 ounces, about the size of a deck of cards. · Eat 4 to 5 servings of nuts, seeds, and legumes (cooked dried beans, lentils, and split peas) each week. A serving is 1/3 cup of nuts, 2 tablespoons of seeds, or ½ cup of cooked beans or peas. · Limit fats and oils to 2 to 3 servings each day. A serving is 1 teaspoon of vegetable oil or 2 tablespoons of salad dressing. · Limit sweets and added sugars to 5 servings or less a week. A serving is 1 tablespoon jelly or jam, ½ cup sorbet, or 1 cup of lemonade. · Eat less than 2,300 milligrams (mg) of sodium a day. If you limit your sodium to 1,500 mg a day, you can lower your blood pressure even more. Tips for success  · Start small. Do not try to make dramatic changes to your diet all at once. You might feel that you are missing out on your favorite foods and then be more likely to not follow the plan. Make small changes, and stick with them. Once those changes become habit, add a few more changes. · Try some of the following:  ? Make it a goal to eat a fruit or vegetable at every meal and at snacks. This will make it easy to get the recommended amount of fruits and vegetables each day.   ? Try yogurt topped with fruit and nuts for a snack or healthy dessert. ? Add lettuce, tomato, cucumber, and onion to sandwiches. ? Combine a ready-made pizza crust with low-fat mozzarella cheese and lots of vegetable toppings. Try using tomatoes, squash, spinach, broccoli, carrots, cauliflower, and onions. ? Have a variety of cut-up vegetables with a low-fat dip as an appetizer instead of chips and dip. ? Sprinkle sunflower seeds or chopped almonds over salads. Or try adding chopped walnuts or almonds to cooked vegetables. ? Try some vegetarian meals using beans and peas. Add garbanzo or kidney beans to salads. Make burritos and tacos with mashed abdalla beans or black beans. Where can you learn more? Go to http://zahc-medina.info/. Enter D031 in the search box to learn more about \"DASH Diet: Care Instructions. \"  Current as of: July 22, 2018  Content Version: 11.9  © 2567-0182 [employer]. Care instructions adapted under license by [employer] (which disclaims liability or warranty for this information). If you have questions about a medical condition or this instruction, always ask your healthcare professional. [address] any warranty or liability for your use of this information.

## 2019-04-30 NOTE — PROGRESS NOTES
[facility] [facility] Note      Subjective:     Chief Complaint   Patient presents with    Hypertension     3 month follow up    Weight Management     3 month follow up      Fay Garnett is a 66y.o. year old female who presents for evaluation of the following:      PMH:   HTN:   Tx: HCTZ 25mg daily  No home monitoring. Eating more vegetables  - not eating chips, eating at sisters house less often  Wt down another 6 lbs     Obesity:   Body mass index is 45.48 kg/m². Previous Attempts:Lost 50 lbs 2 years ago and regained all but 13lbs. Diet: Eating more bread and cake recently  Activity:  Walking  Wt 239>228>222>225    HLD:   Tx: atorvastatin  ASCVD Risk 11.9% in 10/2018    Social:   Works- retired postal   Lives alone. No children  - moved to [place] to be near her niece who is also a patient here        Review of Systems   Pertinent positives and negative per HPI. All other systems  reviewed are negative for a Comprehensive ROS (10+). History reviewed. No pertinent past medical history. Social History     Socioeconomic History    Marital status: SINGLE     Spouse name: Not on file    Number of children: Not on file    Years of education: Not on file    Highest education level: Not on file   Occupational History    Not on file   Social Needs    Financial resource strain: Not on file    Food insecurity:     Worry: Not on file     Inability: Not on file    Transportation needs:     Medical: Not on file     Non-medical: Not on file   Tobacco Use    Smoking status: Never Smoker    Smokeless tobacco: Never Used   Substance and Sexual Activity    Alcohol use:  Yes     Alcohol/week: 1.2 oz     Types: 1 Glasses of wine, 1 Cans of beer per week     Comment: occasinal    Drug use: No    Sexual activity: Not Currently   Lifestyle    Physical activity:     Days per week: Not on file     Minutes per session: Not on file    Stress: Not on file   Relationships    Social connections:     Talks on phone: Not on file     Gets together: Not on file     Attends Gnosticist service: Not on file     Active member of club or organization: Not on file     Attends meetings of clubs or organizations: Not on file     Relationship status: Not on file    Intimate partner violence:     Fear of current or ex partner: Not on file     Emotionally abused: Not on file     Physically abused: Not on file     Forced sexual activity: Not on file   Other Topics Concern    Not on file   Social History Narrative    Not on file       Current Outpatient Medications   Medication Sig    atorvastatin (LIPITOR) 20 mg tablet TAKE 1/2 TAB DAILY FOR 1 WEEK IF TOLERATED TAKE 1 TAB DAILY    aspirin 81 mg chewable tablet TAKE 1 TABLET BY MOUTH EVERY DAY    hydroCHLOROthiazide (HYDRODIURIL) 25 mg tablet TAKE 1 TABLET BY MOUTH EVERY DAY    multivitamin (ONE A DAY) tablet Take 1 Tab by mouth daily.  nystatin (MYCOSTATIN) powder APPLY TO AFFECTED AREA TWICE A DAY    nystatin-triamcinolone (MYCOLOG II) topical cream APPLY TO AFFECTED AREA TWICE A DAY     No current facility-administered medications for this visit. Objective:     Vitals:    04/30/19 1145   BP: 137/81   Pulse: 61   Resp: 17   Temp: 97.8 °F (36.6 °C)   TempSrc: Oral   SpO2: 100%   Weight: 225 lb 3.2 oz (102.2 kg)   Height: 4' 11\" (1.499 m)       Physical Examination:  General: Alert, cooperative, no distress, appears stated age. Obese  Eyes: Conjunctivae clear. Ears: Normal external ear canals both ears. Nose: Nares normal.   Mouth/Throat: Lips, mucosa, and tongue normal.   Neck: Supple, symmetrical, trachea midline, no adenopathy. No thyroid enlargement/tenderness/nodules  Back: Symmetric, no curvature. Lungs: Clear to auscultation bilaterally. Normal inspiratory and expiratory ratio. Heart: Regular rate and rhythm, S1, S2 normal, no murmur, click, rub or gallop. Abdomen: Soft, non-tender, non distended.  Normal bowel sounds  Extremities: Extremities normal, atraumatic, no cyanosis or edema. Skin: Skin color, texture, turgor normal. No rashes or lesions on exposed skin. Lymph nodes: Cervical, supraclavicular nodes normal.  Neurologic: CNII-XII intact. No visits with results within 3 Month(s) from this visit. Latest known visit with results is:   Office Visit on 10/24/2018   Component Date Value Ref Range Status    WBC 10/24/2018 5.6  3.4 - 10.8 x10E3/uL Final    RBC 10/24/2018 3.95  3.77 - 5.28 x10E6/uL Final    HGB 10/24/2018 12.5  11.1 - 15.9 g/dL Final    HCT 10/24/2018 37.4  34.0 - 46.6 % Final    MCV 10/24/2018 95  79 - 97 fL Final    MCH 10/24/2018 31.6  26.6 - 33.0 pg Final    MCHC 10/24/2018 33.4  31.5 - 35.7 g/dL Final    RDW 10/24/2018 14.5  12.3 - 15.4 % Final    PLATELET 93/61/6952 009  150 - 379 x10E3/uL Final    NEUTROPHILS 10/24/2018 56  Not Estab. % Final    Lymphocytes 10/24/2018 37  Not Estab. % Final    MONOCYTES 10/24/2018 5  Not Estab. % Final    EOSINOPHILS 10/24/2018 2  Not Estab. % Final    BASOPHILS 10/24/2018 0  Not Estab. % Final    ABS. NEUTROPHILS 10/24/2018 3.1  1.4 - 7.0 x10E3/uL Final    Abs Lymphocytes 10/24/2018 2.1  0.7 - 3.1 x10E3/uL Final    ABS. MONOCYTES 10/24/2018 0.3  0.1 - 0.9 x10E3/uL Final    ABS. EOSINOPHILS 10/24/2018 0.1  0.0 - 0.4 x10E3/uL Final    ABS. BASOPHILS 10/24/2018 0.0  0.0 - 0.2 x10E3/uL Final    IMMATURE GRANULOCYTES 10/24/2018 0  Not Estab. % Final    ABS. IMM. GRANS.  10/24/2018 0.0  0.0 - 0.1 x10E3/uL Final    Glucose 10/24/2018 83  65 - 99 mg/dL Final    BUN 10/24/2018 17  8 - 27 mg/dL Final    Creatinine 10/24/2018 0.70  0.57 - 1.00 mg/dL Final    GFR est non-AA 10/24/2018 94  >59 mL/min/1.73 Final    GFR est AA 10/24/2018 108  >59 mL/min/1.73 Final    BUN/Creatinine ratio 10/24/2018 24  12 - 28 Final    Sodium 10/24/2018 140  134 - 144 mmol/L Final    Potassium 10/24/2018 5.1  3.5 - 5.2 mmol/L Final    Chloride 10/24/2018 99  96 - 106 mmol/L Final    CO2 10/24/2018 26  20 - 29 mmol/L Final    Calcium 10/24/2018 9.8  8.7 - 10.3 mg/dL Final    Protein, total 10/24/2018 7.4  6.0 - 8.5 g/dL Final    Albumin 10/24/2018 4.2  3.6 - 4.8 g/dL Final    GLOBULIN, TOTAL 10/24/2018 3.2  1.5 - 4.5 g/dL Final    A-G Ratio 10/24/2018 1.3  1.2 - 2.2 Final    Bilirubin, total 10/24/2018 0.6  0.0 - 1.2 mg/dL Final    Alk. phosphatase 10/24/2018 124* 39 - 117 IU/L Final    AST (SGOT) 10/24/2018 20  0 - 40 IU/L Final    ALT (SGPT) 10/24/2018 14  0 - 32 IU/L Final    Cholesterol, total 10/24/2018 205* 100 - 199 mg/dL Final    Triglyceride 10/24/2018 83  0 - 149 mg/dL Final    HDL Cholesterol 10/24/2018 71  >39 mg/dL Final    VLDL, calculated 10/24/2018 17  5 - 40 mg/dL Final    LDL, calculated 10/24/2018 117* 0 - 99 mg/dL Final    TSH 10/24/2018 1.220  0.450 - 4.500 uIU/mL Final    T4, Free 10/24/2018 1.21  0.82 - 1.77 ng/dL Final    Hemoglobin A1c 10/24/2018 5.8* 4.8 - 5.6 % Final    Comment:          Prediabetes: 5.7 - 6.4           Diabetes: >6.4           Glycemic control for adults with diabetes: <7.0      Estimated average glucose 10/24/2018 120  mg/dL Final    Hep C Virus Ab 10/24/2018 <0.1  0.0 - 0.9 s/co ratio Final    Comment:                                   Negative:     < 0.8                               Indeterminate: 0.8 - 0.9                                    Positive:     > 0.9   The CDC recommends that a positive HCV antibody result   be followed up with a HCV Nucleic Acid Amplification   test ([id number]).  INTERPRETATION 10/24/2018 Note   Final    Supplemental report is available. Assessment/ Plan:   Diagnoses and all orders for this visit:    1. Essential hypertension    2. Other hyperlipidemia  -     LIPID PANEL    3. Obesity, morbid ([address]          HTN well controlled. Continue HCTZ. Low salt diet encouraged. HLD. Continue statin. Lab to monitor.    Diet and lifestyle modification encouraged for weighs loss.      I have discussed the diagnosis with the patient and the intended plan as seen in the above orders. The patient has been offered or received an after-visit summary and questions were answered concerning future plans. I have discussed medication side effects and warnings with the patient as well. Follow-up and Dispositions    · Return in about 3 months (around 7/30/2019) for Follow Up.             Signed,    Jhoan Aldana MD  4/30/2019

## 2019-04-30 NOTE — PROGRESS NOTES
Chief Complaint   Patient presents with    Hypertension     3 month follow up    Weight Management     3 month follow up      1. Have you been to the ER, urgent care clinic since your last visit? Hospitalized since your last visit? No    2. Have you seen or consulted any other health care providers outside of the [address] [place] [address] since your last visit? Include any pap smears or colon screening.  No    Most recent mammogram and colonoscopy requested

## 2019-05-01 LAB
CHOLEST SERPL-MCNC: 146 MG/DL (ref 100–199)
HDLC SERPL-MCNC: 69 MG/DL
INTERPRETATION, 910389: NORMAL
LDLC SERPL CALC-MCNC: 64 MG/DL (ref 0–99)
TRIGL SERPL-MCNC: 64 MG/DL (ref 0–149)
VLDLC SERPL CALC-MCNC: 13 MG/DL (ref 5–40)

## 2019-05-01 NOTE — TELEPHONE ENCOUNTER
Call placed to [facility]. Patients name and  verified. They stated to release colonoscopy results they need a signed release from patient.

## 2019-05-06 NOTE — PROGRESS NOTES
Outbound call to patient. No answer left message to return call regarding labs. Advise results are available on my chart. Letter printed with results.

## 2019-05-24 ENCOUNTER — OFFICE VISIT (OUTPATIENT)
Dept: FAMILY MEDICINE CLINIC | Age: 64
End: 2019-05-24

## 2019-05-24 VITALS
RESPIRATION RATE: 16 BRPM | HEIGHT: 59 IN | TEMPERATURE: 98.4 F | SYSTOLIC BLOOD PRESSURE: 140 MMHG | DIASTOLIC BLOOD PRESSURE: 69 MMHG | WEIGHT: 230 LBS | BODY MASS INDEX: 46.37 KG/M2 | OXYGEN SATURATION: 97 % | HEART RATE: 68 BPM

## 2019-05-24 DIAGNOSIS — H43.391 VITREOUS FLOATERS OF RIGHT EYE: Primary | ICD-10-CM

## 2019-05-24 NOTE — PROGRESS NOTES
Assessment/Plan:     Diagnoses and all orders for this visit:    1. Vitreous floaters of right eye    Discussed the benign nature of floaters. She will follow-up with ophthalmology if she has any additional concerns. She may discontinue Visine eyedrops but as that is not a recommended treatment for vitreous floaters. She will return sooner if symptoms worsen or she develops concerning signs including blurred vision or blindness. Follow-up and Dispositions    · Return if symptoms worsen or fail to improve. Discussed expected course/resolution/complications of diagnosis in detail with patient.    Medication risks/benefits/costs/interactions/alternatives discussed with patient.    Pt was given after visit summary which includes diagnoses, current medications & vitals. Pt expressed understanding with the diagnosis and plan          Subjective:      Magalie Alejandro is a 66 y.o. female who presents for had concerns including Red Eye (right x 1 day). Reports concerns regarding the right eye and changes over the past 24 hours. Reports an abnormal visual change involving floaters which are visible more easily in bright light. She denies visual loss including blindness or loss of peripheral vision. She denies headaches, nausea, vomiting, recent head injury, or blurred vision. She is up-to-date on routine eye exams. She is otherwise well without complaints. There is no associated eye pain or redness. This is her first evaluation. She has attempted OTC Visine eyedrops without improvement. She does wear glasses on a routine basis.     Current Outpatient Medications   Medication Sig Dispense Refill    atorvastatin (LIPITOR) 20 mg tablet TAKE 1/2 TAB DAILY FOR 1 WEEK IF TOLERATED TAKE 1 TAB DAILY 90 Tab 1    aspirin 81 mg chewable tablet TAKE 1 TABLET BY MOUTH EVERY DAY 90 Tab 1    hydroCHLOROthiazide (HYDRODIURIL) 25 mg tablet TAKE 1 TABLET BY MOUTH EVERY DAY 90 Tab 1    multivitamin (ONE A DAY) tablet Take 1 Tab by mouth daily. No Known Allergies    ROS:   Review of Systems   Constitutional: Negative for malaise/fatigue. Eyes: Negative for blurred vision, double vision, photophobia, pain, discharge and redness. Respiratory: Negative for shortness of breath. Cardiovascular: Negative for chest pain. Objective:     Visit Vitals  /69   Pulse 68   Temp 98.4 °F (36.9 °C) (Oral)   Resp 16   Ht 4' 11\" (1.499 m)   Wt 230 lb (104.3 kg)   SpO2 97%   BMI 46.45 kg/m²       Vitals and Nurse Documentation reviewed. Physical Exam   Constitutional: No distress. Eyes: Pupils are equal, round, and reactive to light. Conjunctivae, EOM and lids are normal.   Fundoscopic exam:       The right eye shows no hemorrhage. The right eye shows red reflex. The left eye shows no hemorrhage. The left eye shows red reflex. Cardiovascular: S1 normal and S2 normal. Exam reveals no gallop and no friction rub. No murmur heard. Pulmonary/Chest: Breath sounds normal. No respiratory distress. Skin: Skin is warm and dry.    Psychiatric: Mood and affect normal.

## 2019-05-24 NOTE — PATIENT INSTRUCTIONS
Floaters and Flashes: Care Instructions  Your Care Instructions    Floaters are spots and lines that \"float\" across your field of vision. They are caused by stray cells or strands of tissue inside the eyeball. Flashes are sparkles or lightning streaks. These occur in your side vision. This is called the peripheral vision. Floaters and flashes usually aren't serious. In many cases, they're a normal part of getting older. Some people get used to them. Others find them annoying. If floaters bother you, you can try to look up and then down. This may make them go away. For now, your doctor doesn't think your symptoms are a sign of a more serious problem. But an eye exam is the only way to know for sure. Follow-up care is a key part of your treatment and safety. Be sure to make and go to all appointments, and call your doctor if you are having problems. It's also a good idea to know your test results and keep a list of the medicines you take. When should you call for help? Call your doctor now or seek immediate medical care if:    · You have vision changes.    Watch closely for changes in your health, and be sure to contact your doctor if:    · You see new floaters.     · You see new flashes of light.     · You do not get better as expected. Where can you learn more? Go to http://zach-medina.info/. Enter U822 in the search box to learn more about \"Floaters and Flashes: Care Instructions. \"  Current as of: July 17, 2018  Content Version: 11.9  © 9895-8165 [employer]. Care instructions adapted under license by [employer] (which disclaims liability or warranty for this information). If you have questions about a medical condition or this instruction, always ask your healthcare professional. [address] any warranty or liability for your use of this information.

## 2019-05-24 NOTE — PROGRESS NOTES
Chief Complaint   Patient presents with    Red Eye     right x 1 day     1. Have you been to the ER, urgent care clinic since your last visit? Hospitalized since your last visit? No    2. Have you seen or consulted any other health care providers outside of the [address] [place] [address] since your last visit? Include any pap smears or colon screening.  No

## 2019-08-14 ENCOUNTER — OFFICE VISIT (OUTPATIENT)
Dept: FAMILY MEDICINE CLINIC | Age: 64
End: 2019-08-14

## 2019-08-14 VITALS
TEMPERATURE: 98.3 F | OXYGEN SATURATION: 95 % | SYSTOLIC BLOOD PRESSURE: 126 MMHG | RESPIRATION RATE: 17 BRPM | WEIGHT: 225.4 LBS | DIASTOLIC BLOOD PRESSURE: 65 MMHG | BODY MASS INDEX: 45.44 KG/M2 | HEART RATE: 68 BPM | HEIGHT: 59 IN

## 2019-08-14 DIAGNOSIS — I10 ESSENTIAL HYPERTENSION: Primary | ICD-10-CM

## 2019-08-14 DIAGNOSIS — E78.49 OTHER HYPERLIPIDEMIA: ICD-10-CM

## 2019-08-14 DIAGNOSIS — E66.01 OBESITY, MORBID (HCC): ICD-10-CM

## 2019-08-14 NOTE — PATIENT INSTRUCTIONS
Arm Circumference 39cm or 15.5 in    Bad cholesterol - LDL  Good Cholesterol - HDL     DASH Diet: Care Instructions  Your Care Instructions    The DASH diet is an eating plan that can help lower your blood pressure. DASH stands for Dietary Approaches to Stop Hypertension. Hypertension is high blood pressure. The DASH diet focuses on eating foods that are high in calcium, potassium, and magnesium. These nutrients can lower blood pressure. The foods that are highest in these nutrients are fruits, vegetables, low-fat dairy products, nuts, seeds, and legumes. But taking calcium, potassium, and magnesium supplements instead of eating foods that are high in those nutrients does not have the same effect. The DASH diet also includes whole grains, fish, and poultry. The DASH diet is one of several lifestyle changes your doctor may recommend to lower your high blood pressure. Your doctor may also want you to decrease the amount of sodium in your diet. Lowering sodium while following the DASH diet can lower blood pressure even further than just the DASH diet alone. Follow-up care is a key part of your treatment and safety. Be sure to make and go to all appointments, and call your doctor if you are having problems. It's also a good idea to know your test results and keep a list of the medicines you take. How can you care for yourself at home? Following the DASH diet  · Eat 4 to 5 servings of fruit each day. A serving is 1 medium-sized piece of fruit, ½ cup chopped or canned fruit, 1/4 cup dried fruit, or 4 ounces (½ cup) of fruit juice. Choose fruit more often than fruit juice. · Eat 4 to 5 servings of vegetables each day. A serving is 1 cup of lettuce or raw leafy vegetables, ½ cup of chopped or cooked vegetables, or 4 ounces (½ cup) of vegetable juice. Choose vegetables more often than vegetable juice. · Get 2 to 3 servings of low-fat and fat-free dairy each day.  A serving is 8 ounces of milk, 1 cup of yogurt, or 1 ½ ounces of cheese. · Eat 6 to 8 servings of grains each day. A serving is 1 slice of bread, 1 ounce of dry cereal, or ½ cup of cooked rice, pasta, or cooked cereal. Try to choose whole-grain products as much as possible. · Limit lean meat, poultry, and fish to 2 servings each day. A serving is 3 ounces, about the size of a deck of cards. · Eat 4 to 5 servings of nuts, seeds, and legumes (cooked dried beans, lentils, and split peas) each week. A serving is 1/3 cup of nuts, 2 tablespoons of seeds, or ½ cup of cooked beans or peas. · Limit fats and oils to 2 to 3 servings each day. A serving is 1 teaspoon of vegetable oil or 2 tablespoons of salad dressing. · Limit sweets and added sugars to 5 servings or less a week. A serving is 1 tablespoon jelly or jam, ½ cup sorbet, or 1 cup of lemonade. · Eat less than 2,300 milligrams (mg) of sodium a day. If you limit your sodium to 1,500 mg a day, you can lower your blood pressure even more. Tips for success  · Start small. Do not try to make dramatic changes to your diet all at once. You might feel that you are missing out on your favorite foods and then be more likely to not follow the plan. Make small changes, and stick with them. Once those changes become habit, add a few more changes. · Try some of the following:  ? Make it a goal to eat a fruit or vegetable at every meal and at snacks. This will make it easy to get the recommended amount of fruits and vegetables each day. ? Try yogurt topped with fruit and nuts for a snack or healthy dessert. ? Add lettuce, tomato, cucumber, and onion to sandwiches. ? Combine a ready-made pizza crust with low-fat mozzarella cheese and lots of vegetable toppings. Try using tomatoes, squash, spinach, broccoli, carrots, cauliflower, and onions. ? Have a variety of cut-up vegetables with a low-fat dip as an appetizer instead of chips and dip. ? Sprinkle sunflower seeds or chopped almonds over salads.  Or try adding chopped walnuts or almonds to cooked vegetables. ? Try some vegetarian meals using beans and peas. Add garbanzo or kidney beans to salads. Make burritos and tacos with mashed abdalla beans or black beans. Where can you learn more? Go to http://zach-medina.info/. Enter J900 in the search box to learn more about \"DASH Diet: Care Instructions. \"  Current as of: July 22, 2018  Content Version: 12.1  © 3618-9597 [employer]. Care instructions adapted under license by [employer] (which disclaims liability or warranty for this information). If you have questions about a medical condition or this instruction, always ask your healthcare professional. [address] any warranty or liability for your use of this information. When You Are Overweight: Care Instructions  Your Care Instructions    If you're overweight, your doctor may recommend that you make changes in your eating and exercise habits. Being overweight can lead to serious health problems, such as high blood pressure, heart disease, type 2 diabetes, and arthritis, or it can make these problems worse. Eating a healthy diet and being more active can help you reach and stay at a healthy weight. You don't have to make huge changes all at once. Start by making small changes in your eating and exercise habits. To lose weight, you need to burn more calories than you take in. You can do this by eating healthy foods in reasonable amounts and becoming more active every day. Follow-up care is a key part of your treatment and safety. Be sure to make and go to all appointments, and call your doctor if you are having problems. It's also a good idea to know your test results and keep a list of the medicines you take. How can you care for yourself at home? · Improve your eating habits. You'll be more successful if you work on changing one eating habit at a time.  All foods, if eaten in moderation, can be part of healthy eating. Remember to:  ? Eat a variety of foods from each food group. Include grains, vegetables, fruits, dairy, and protein foods. ? Limit foods high in fat, sugar, and calories. ? Eat slowly. And don't do anything else, such as watch TV, while you are eating. ? Pay attention to portion sizes. Put your food on a smaller plate. ? Plan your meals ahead of time. You'll be less likely to grab something that's not as healthy. · Get active. Regular activity can help you feel better, have more energy, and burn more calories. If you haven't been active, start slowly. Start with at least 30 minutes of moderate activity on most days of the week. Then gradually increase the amount of activity. Try for 60 or 90 minutes a day, at least 5 days a week. There are a lot of ways to fit activity into your life. You can:  ? Walk or bike to the store. Or walk with a friend, or walk the dog.  ? Mow the lawn, rake leaves, shovel snow, or do some gardening. ? Use the stairs instead of the elevator, at least for a few floors. · Change your thinking. Your thoughts have a lot to do with how you feel and what you do. When you're trying to reach a healthy weight, changing how you think about certain things may help. Here are some ideas:  ? Don't compare yourself to others. Healthy bodies come in all shapes and sizes. ? Pay attention to how hungry or full you feel. When you eat, be aware of why you're eating and how much you're eating. ? Focus on improving your health instead of dieting. Dieting almost never works over the long term. · Ask your doctor about other health professionals who can help you reach a healthy weight. ? A dietitian can help you make healthy changes in your diet. ?  An exercise specialist or  can help you develop a safe and effective exercise program.  ? A counselor or psychiatrist can help you cope with issues such as depression, anxiety, or family problems that can make it hard to focus on reaching a healthy weight. · Get support from your family, your doctor, your friends, a support group--and support yourself. Where can you learn more? Go to http://zach-medina.info/. Enter F337 in the search box to learn more about \"When You Are Overweight: Care Instructions. \"  Current as of: March 28, 2019  Content Version: 12.1  © 1278-8381 [employer]. Care instructions adapted under license by [employer] (which disclaims liability or warranty for this information). If you have questions about a medical condition or this instruction, always ask your healthcare professional. [address] any warranty or liability for your use of this information.

## 2019-08-14 NOTE — PROGRESS NOTES
Chief Complaint   Patient presents with    Hypertension     follow up     Other     colooscopy referral     1. Have you been to the ER, urgent care clinic since your last visit? Hospitalized since your last visit? No    2. Have you seen or consulted any other health care providers outside of the [address] [place] [address] since your last visit? Include any pap smears or colon screening.  No

## 2019-08-14 NOTE — PROGRESS NOTES
[facility] Note      Subjective:     Chief Complaint   Patient presents with    Hypertension     follow up     Other     colooscopy referral     Enoch Medrano is a 66y.o. year old female who presents for evaluation of the following:      PMH:   HTN:   Tx: HCTZ 25mg daily  No home monitoring. Eating more vegetables  - Eating less chips   BP Readings from Last 3 Encounters:   08/14/19 126/65   05/24/19 140/69   04/30/19 137/81        Obesity:   Previous Attempts: Lost 50 lbs 2 years ago and regained all but 13lbs. Diet: Unrestricted previously but trying to eat healthy   Activity:  Walking  Last Weight Metrics:  Weight Loss Metrics 8/14/2019 5/24/2019 4/30/2019 2/6/2019 11/7/2018 10/24/2018   Today's Wt 225 lb 6.4 oz 230 lb 225 lb 3.2 oz 222 lb 228 lb 6.4 oz 239 lb   BMI 45.53 kg/m2 46.45 kg/m2 45.48 kg/m2 44.84 kg/m2 46.13 kg/m2 48.27 kg/m2       HLD:   Tx: atorvastatin   Lab Results   Component Value Date/Time    Cholesterol, total 146 04/30/2019 12:36 PM    HDL Cholesterol 69 04/30/2019 12:36 PM    LDL, calculated 64 04/30/2019 12:36 PM    VLDL, calculated 13 04/30/2019 12:36 PM    Triglyceride 64 04/30/2019 12:36 PM         Social:   Works- retired postal   Lives alone. No children  - moved to [place] to be near her niece who is also a patient here        Review of Systems   Pertinent positives and negative per HPI. All other systems  reviewed are negative for a Comprehensive ROS (10+). History reviewed. No pertinent past medical history.      Social History     Socioeconomic History    Marital status: SINGLE     Spouse name: Not on file    Number of children: Not on file    Years of education: Not on file    Highest education level: Not on file   Occupational History    Not on file   Social Needs    Financial resource strain: Not on file    Food insecurity:     Worry: Not on file     Inability: Not on file    Transportation needs:     Medical: Not on file     Non-medical: Not on file   Tobacco Use    Smoking status: Never Smoker    Smokeless tobacco: Never Used   Substance and Sexual Activity    Alcohol use: Yes     Alcohol/week: 2.0 standard drinks     Types: 1 Glasses of wine, 1 Cans of beer per week     Comment: occasinal    Drug use: No    Sexual activity: Not Currently   Lifestyle    Physical activity:     Days per week: Not on file     Minutes per session: Not on file    Stress: Not on file   Relationships    Social connections:     Talks on phone: Not on file     Gets together: Not on file     Attends Rastafari service: Not on file     Active member of club or organization: Not on file     Attends meetings of clubs or organizations: Not on file     Relationship status: Not on file    Intimate partner violence:     Fear of current or ex partner: Not on file     Emotionally abused: Not on file     Physically abused: Not on file     Forced sexual activity: Not on file   Other Topics Concern    Not on file   Social History Narrative    Not on file       Current Outpatient Medications   Medication Sig    atorvastatin (LIPITOR) 20 mg tablet TAKE 1/2 TAB DAILY FOR 1 WEEK IF TOLERATED TAKE 1 TAB DAILY    aspirin 81 mg chewable tablet TAKE 1 TABLET BY MOUTH EVERY DAY    hydroCHLOROthiazide (HYDRODIURIL) 25 mg tablet TAKE 1 TABLET BY MOUTH EVERY DAY    multivitamin (ONE A DAY) tablet Take 1 Tab by mouth daily. No current facility-administered medications for this visit. Objective:     Vitals:    08/14/19 0800   BP: 126/65   Pulse: 68   Resp: 17   Temp: 98.3 °F (36.8 °C)   TempSrc: Oral   SpO2: 95%   Weight: 225 lb 6.4 oz (102.2 kg)   Height: 4' 11\" (1.499 m)       Physical Examination:  General: Alert, cooperative, no distress, appears stated age. Obese  Eyes: Conjunctivae clear. Ears: Normal external ear canals both ears.    Nose: Nares normal.   Mouth/Throat: Lips, mucosa, and tongue normal.   Neck: Supple, symmetrical, trachea midline, no adenopathy. No thyroid enlargement/tenderness/nodules  Back: Symmetric, no curvature. Lungs: Clear to auscultation bilaterally. Normal inspiratory and expiratory ratio. Heart: Regular rate and rhythm, S1, S2 normal, no murmur, click, rub or gallop. Abdomen: Soft, non-tender, non distended. Normal bowel sounds  Extremities: Extremities normal, atraumatic, no cyanosis or edema. - Left arm cicumference 39cm  Skin: Skin color, texture, turgor normal. No rashes or lesions on exposed skin. Lymph nodes: Cervical, supraclavicular nodes normal.  Neurologic: CNII-XII intact. No visits with results within 3 Month(s) from this visit. Latest known visit with results is:   Office Visit on 04/30/2019   Component Date Value Ref Range Status    Cholesterol, total 04/30/2019 146  100 - 199 mg/dL Final    Triglyceride 04/30/2019 64  0 - 149 mg/dL Final    HDL Cholesterol 04/30/2019 69  >39 mg/dL Final    VLDL, calculated 04/30/2019 13  5 - 40 mg/dL Final    LDL, calculated 04/30/2019 64  0 - 99 mg/dL Final    INTERPRETATION 04/30/2019 Note   Final    Supplemental report is available. Assessment/ Plan:   Diagnoses and all orders for this visit:    1. Essential hypertension    2. Other hyperlipidemia    3. Obesity, morbid ([address]      HTN well controlled. Continue HCTZ. Low salt diet encouraged. HLD. Continue statin. Lab to monitor. Diet and lifestyle modification encouraged for weighs loss. Declined medications for appetitite suppressant. I have discussed the diagnosis with the patient and the intended plan as seen in the above orders. The patient has been offered or received an after-visit summary and questions were answered concerning future plans. I have discussed medication side effects and warnings with the patient as well.           Follow-up and Dispositions    · Return in about 3 months (around 11/14/2019) for Physical exam.         Signed,    Luna Judd MD  8/14/2019

## 2019-09-09 ENCOUNTER — TELEPHONE (OUTPATIENT)
Dept: FAMILY MEDICINE CLINIC | Age: 64
End: 2019-09-09

## 2019-09-09 NOTE — TELEPHONE ENCOUNTER
Patient is calling requesting for order to be placed to have a bone density test done.        Best callback:  [phone]      LOV:  Wednesday, August 14, 2019

## 2019-09-09 NOTE — TELEPHONE ENCOUNTER
Outbound call to patient. Name and  verified. Advised patient per provider bone density scan recommended at 67. Patient verbalized understanding and was appreciative of call.

## 2019-10-11 DIAGNOSIS — E78.5 HYPERLIPIDEMIA, UNSPECIFIED HYPERLIPIDEMIA TYPE: ICD-10-CM

## 2019-10-11 DIAGNOSIS — I10 ESSENTIAL HYPERTENSION: ICD-10-CM

## 2019-10-11 NOTE — TELEPHONE ENCOUNTER
Pt. called in today requesting a 90-days supply refill on the following meds. Pharm on file verified.     LOV 08/14/2019    Requested Prescriptions     Pending Prescriptions Disp Refills    hydroCHLOROthiazide (HYDRODIURIL) 25 mg tablet 90 Tab 1    aspirin 81 mg chewable tablet 90 Tab 1    atorvastatin (LIPITOR) 20 mg tablet 90 Tab 1

## 2019-10-13 RX ORDER — ATORVASTATIN CALCIUM 20 MG/1
20 TABLET, FILM COATED ORAL DAILY
Qty: 90 TAB | Refills: 1 | Status: SHIPPED | OUTPATIENT
Start: 2019-10-13 | End: 2020-03-31 | Stop reason: SDUPTHER

## 2019-10-13 RX ORDER — HYDROCHLOROTHIAZIDE 25 MG/1
TABLET ORAL
Qty: 90 TAB | Refills: 1 | Status: SHIPPED | OUTPATIENT
Start: 2019-10-13 | End: 2020-03-31 | Stop reason: SDUPTHER

## 2019-10-13 RX ORDER — GUAIFENESIN 100 MG/5ML
LIQUID (ML) ORAL
Qty: 90 TAB | Refills: 1 | Status: SHIPPED | OUTPATIENT
Start: 2019-10-13 | End: 2020-04-06 | Stop reason: SDUPTHER

## 2019-10-25 ENCOUNTER — OFFICE VISIT (OUTPATIENT)
Dept: FAMILY MEDICINE CLINIC | Age: 64
End: 2019-10-25

## 2019-10-25 VITALS
OXYGEN SATURATION: 95 % | SYSTOLIC BLOOD PRESSURE: 129 MMHG | HEIGHT: 59 IN | DIASTOLIC BLOOD PRESSURE: 74 MMHG | BODY MASS INDEX: 45.96 KG/M2 | TEMPERATURE: 98 F | RESPIRATION RATE: 17 BRPM | WEIGHT: 228 LBS | HEART RATE: 60 BPM

## 2019-10-25 DIAGNOSIS — H93.291 ABNORMAL AUDITORY PERCEPTION OF RIGHT EAR: ICD-10-CM

## 2019-10-25 DIAGNOSIS — E66.01 OBESITY, MORBID (HCC): ICD-10-CM

## 2019-10-25 DIAGNOSIS — Z00.00 WELL WOMAN EXAM (NO GYNECOLOGICAL EXAM): Primary | ICD-10-CM

## 2019-10-25 DIAGNOSIS — Z23 ENCOUNTER FOR IMMUNIZATION: ICD-10-CM

## 2019-10-25 DIAGNOSIS — Z12.39 SCREENING FOR MALIGNANT NEOPLASM OF BREAST: ICD-10-CM

## 2019-10-25 DIAGNOSIS — R73.03 PREDIABETES: ICD-10-CM

## 2019-10-25 DIAGNOSIS — E87.5 SERUM POTASSIUM ELEVATED: ICD-10-CM

## 2019-10-25 DIAGNOSIS — E78.49 OTHER HYPERLIPIDEMIA: ICD-10-CM

## 2019-10-25 DIAGNOSIS — Z12.11 SCREENING FOR MALIGNANT NEOPLASM OF COLON: ICD-10-CM

## 2019-10-25 NOTE — PATIENT INSTRUCTIONS
Weight Loss Tips:  Remember this is like a part time job so your motivation and commitment is key to your success. Mindset  Weight loss like any other behavior change starts in your mind. Think hard about what your motivates you to lose weight then meditate on that. Remind yourself of your motivation  with phone alarms, scheduled meditation time, vision board, journal- just to name a few ideas. Have realistic goals. We expect with diligent healthy diet and physical activity you can lose 5% of your body weight in 3  months. Wt in lbs x 0.05 = #lbs you should lose in 3 months. Make food and activity changes with a goal of CONSISTENCY not perfection. Food  Start eating differently. Most of your weight loss and gain is from what you eat. Use small plates only  Drink 2 liters (1/2 gallon) of water every day  HALF of every meal should be fruit or vegetables  Try meal prepping on Sunday (or your day off) with new different vegetables. Consider meal prep service such as Cle[employer], wepremeals. com  Replace soda with diet soda or other zero sugar drinks (selter water just fine)  Consider using the [employer] sonja for calorie counting. Goal 5075-5914 calories per day    Activity  Staying physically active will help you lose more weight and can help you get over the plateau when you weight just  won't change any more with diet. Start exercise at least 5 days per week for 40 minutes. Consider [employer] training sonja for home exercises. You can start with walking. I suggest walking at a speed of at least 3.5-4.5mph to for the weight loss benefit. Increase  your speed or distance every 2 weeks. Do some slow stretching daily of legs, arms and back. Consider adding weight training with light weights at home or at the gym. See a doctor or a physical training for  instructions in order to avoid injuries from doing muscle training incorrectly.          Well Visit, Women 48 to 72: Care Instructions  Your Care Instructions    Physical exams can help you stay healthy. Your doctor has checked your overall health and may have suggested ways to take good care of yourself. He or she also may have recommended tests. At home, you can help prevent illness with healthy eating, regular exercise, and other steps. Follow-up care is a key part of your treatment and safety. Be sure to make and go to all appointments, and call your doctor if you are having problems. It's also a good idea to know your test results and keep a list of the medicines you take. How can you care for yourself at home? · Reach and stay at a healthy weight. This will lower your risk for many problems, such as obesity, diabetes, heart disease, and high blood pressure. · Get at least 30 minutes of exercise on most days of the week. Walking is a good choice. You also may want to do other activities, such as running, swimming, cycling, or playing tennis or team sports. · Do not smoke. Smoking can make health problems worse. If you need help quitting, talk to your doctor about stop-smoking programs and medicines. These can increase your chances of quitting for good. · Protect your skin from too much sun. When you're outdoors from 10 a.m. to 4 p.m., stay in the shade or cover up with clothing and a hat with a wide brim. Wear sunglasses that block UV rays. Even when it's cloudy, put broad-spectrum sunscreen (SPF 30 or higher) on any exposed skin. · See a dentist one or two times a year for checkups and to have your teeth cleaned. · Wear a seat belt in the car. Follow your doctor's advice about when to have certain tests. These tests can spot problems early. · Cholesterol. Your doctor will tell you how often to have this done based on your age, family history, or other things that can increase your risk for heart attack and stroke. · Blood pressure. Have your blood pressure checked during a routine doctor visit.  Your doctor will tell you how often to check your blood pressure based on your age, your blood pressure results, and other factors. · Mammogram. Ask your doctor how often you should have a mammogram, which is an X-ray of your breasts. A mammogram can spot breast cancer before it can be felt and when it is easiest to treat. · Pap test and pelvic exam. Ask your doctor how often you should have a Pap test. You may not need to have a Pap test as often as you used to. · Vision. Have your eyes checked every year or two or as often as your doctor suggests. Some experts recommend that you have yearly exams for glaucoma and other age-related eye problems starting at age 48. · Hearing. Tell your doctor if you notice any change in your hearing. You can have tests to find out how well you hear. · Diabetes. Ask your doctor whether you should have tests for diabetes. · Colorectal cancer. Your risk for colorectal cancer gets higher as you get older. Some experts say that adults should start regular screening at age 48 and stop at age 76. Others say to start before age 48 or continue after age 76. Talk with your doctor about your risk and when to start and stop screening. · Thyroid disease. Talk to your doctor about whether to have your thyroid checked as part of a regular physical exam. Women have an increased chance of a thyroid problem. · Osteoporosis. You should begin tests for bone density at age 72. If you are younger than 72, ask your doctor whether you have factors that may increase your risk for this disease. You may want to have this test before age 72. · Heart attack and stroke risk. At least every 4 to 6 years, you should have your risk for heart attack and stroke assessed. Your doctor uses factors such as your age, blood pressure, cholesterol, and whether you smoke or have diabetes to show what your risk for a heart attack or stroke is over the next 10 years. When should you call for help?   Watch closely for changes in your health, and be sure to contact your doctor if you have any problems or symptoms that concern you. Where can you learn more? Go to http://zach-medina.info/. Enter H643 in the search box to learn more about \"Well Visit, Women 50 to 72: Care Instructions. \"  Current as of: December 13, 2018  Content Version: 12.2  © 8999-0944 [employer]. Care instructions adapted under license by [employer] (which disclaims liability or warranty for this information). If you have questions about a medical condition or this instruction, always ask your healthcare professional. [address] any warranty or liability for your use of this information.

## 2019-10-25 NOTE — PROGRESS NOTES
Chief Complaint   Patient presents with    Complete Physical     annual      1. Have you been to the ER, urgent care clinic since your last visit? Hospitalized since your last visit? No    2. Have you seen or consulted any other health care providers outside of the [address] [place] [address] since your last visit? Include any pap smears or colon screening.  No

## 2019-10-26 LAB
ALBUMIN SERPL-MCNC: 4 G/DL (ref 3.6–4.8)
ALBUMIN/GLOB SERPL: 1.3 {RATIO} (ref 1.2–2.2)
ALP SERPL-CCNC: 141 IU/L (ref 39–117)
ALT SERPL-CCNC: 18 IU/L (ref 0–32)
AST SERPL-CCNC: 19 IU/L (ref 0–40)
BASOPHILS # BLD AUTO: 0 X10E3/UL (ref 0–0.2)
BASOPHILS NFR BLD AUTO: 1 %
BILIRUB SERPL-MCNC: 0.7 MG/DL (ref 0–1.2)
BUN SERPL-MCNC: 20 MG/DL (ref 8–27)
BUN/CREAT SERPL: 26 (ref 12–28)
CALCIUM SERPL-MCNC: 10.1 MG/DL (ref 8.7–10.3)
CHLORIDE SERPL-SCNC: 101 MMOL/L (ref 96–106)
CHOLEST SERPL-MCNC: 143 MG/DL (ref 100–199)
CO2 SERPL-SCNC: 26 MMOL/L (ref 20–29)
CREAT SERPL-MCNC: 0.77 MG/DL (ref 0.57–1)
EOSINOPHIL # BLD AUTO: 0.1 X10E3/UL (ref 0–0.4)
EOSINOPHIL NFR BLD AUTO: 2 %
ERYTHROCYTE [DISTWIDTH] IN BLOOD BY AUTOMATED COUNT: 12.9 % (ref 12.3–15.4)
EST. AVERAGE GLUCOSE BLD GHB EST-MCNC: 126 MG/DL
GLOBULIN SER CALC-MCNC: 3.2 G/DL (ref 1.5–4.5)
GLUCOSE SERPL-MCNC: 90 MG/DL (ref 65–99)
HBA1C MFR BLD: 6 % (ref 4.8–5.6)
HCT VFR BLD AUTO: 38.8 % (ref 34–46.6)
HDLC SERPL-MCNC: 72 MG/DL
HGB BLD-MCNC: 12.9 G/DL (ref 11.1–15.9)
IMM GRANULOCYTES # BLD AUTO: 0 X10E3/UL (ref 0–0.1)
IMM GRANULOCYTES NFR BLD AUTO: 0 %
INTERPRETATION, 910389: NORMAL
LDLC SERPL CALC-MCNC: 61 MG/DL (ref 0–99)
LYMPHOCYTES # BLD AUTO: 1.8 X10E3/UL (ref 0.7–3.1)
LYMPHOCYTES NFR BLD AUTO: 38 %
MCH RBC QN AUTO: 32.1 PG (ref 26.6–33)
MCHC RBC AUTO-ENTMCNC: 33.2 G/DL (ref 31.5–35.7)
MCV RBC AUTO: 97 FL (ref 79–97)
MONOCYTES # BLD AUTO: 0.3 X10E3/UL (ref 0.1–0.9)
MONOCYTES NFR BLD AUTO: 7 %
NEUTROPHILS # BLD AUTO: 2.5 X10E3/UL (ref 1.4–7)
NEUTROPHILS NFR BLD AUTO: 52 %
PLATELET # BLD AUTO: 349 X10E3/UL (ref 150–450)
POTASSIUM SERPL-SCNC: 5.3 MMOL/L (ref 3.5–5.2)
PROT SERPL-MCNC: 7.2 G/DL (ref 6–8.5)
RBC # BLD AUTO: 4.02 X10E6/UL (ref 3.77–5.28)
SODIUM SERPL-SCNC: 142 MMOL/L (ref 134–144)
TRIGL SERPL-MCNC: 52 MG/DL (ref 0–149)
VLDLC SERPL CALC-MCNC: 10 MG/DL (ref 5–40)
WBC # BLD AUTO: 4.7 X10E3/UL (ref 3.4–10.8)

## 2019-10-30 NOTE — PROGRESS NOTES
Notify Patient:  Most of your results are normal. Your potassium level was slightly higher than normal. Be sure to drink 2L of water daily. Return to clinic in 2=4 weeks for lab visit to recheck this. Your blood sugar level is still in the PRE-diabetes range. This means you do not have diabetes but you could develop it later on. As we discussed, I would suggest you change your diet to exclude processed foods such as deli meat and hotdogs. You should also avoid bread, crackers, cakes, and cookies. Try replacing them with whole foods, like fruits and vegetables. Thank you for allowing me to participate in your care.

## 2019-10-31 NOTE — PROGRESS NOTES
Call placed to patient. Name and  verified. Reviewed recent labs with patient. Advised to stop by office at her convenience in the next 2-4 weeks to have labs checked for her potassium level. She verbalized understanding.

## 2019-11-18 ENCOUNTER — TELEPHONE (OUTPATIENT)
Dept: FAMILY MEDICINE CLINIC | Age: 64
End: 2019-11-18

## 2019-11-18 NOTE — TELEPHONE ENCOUNTER
----- Message from Itz Houston sent at 11/18/2019  2:09 PM EST -----  Regarding: Dr. Adam Gama: [phone]  Caller's first and last name: Charley Gamez  Reason for call: wanted to follow up with a nurse on an appt question. Callback required yes/no and why: yes  Best contact number(s): [phone]  Details to clarify the request: gave blood on last appointment and got a call about possibly coming in for appt.

## 2019-11-18 NOTE — TELEPHONE ENCOUNTER
Call placed to patient. Name and  verified. Patient inquired about the appt for labs. Advised she did not need an appt she just needs to stop by.  She stated she has to bring someone in tomorrow and will do it then/

## 2019-11-19 ENCOUNTER — TELEPHONE (OUTPATIENT)
Dept: FAMILY MEDICINE CLINIC | Age: 64
End: 2019-11-19

## 2019-11-19 NOTE — TELEPHONE ENCOUNTER
Call placed to patient. Name and  verified. Patient stated that she already came to the office for test. She was coming for blood work.

## 2019-11-19 NOTE — TELEPHONE ENCOUNTER
----- Message from Meenakshi Joseph sent at 11/19/2019  7:40 AM EST -----  Regarding: Dr Roderick Amaro  Pt needs to speak to the nurse this morning, to see if she needs papers from the office for the test she has to take today, please call pt at [phone].

## 2019-11-20 LAB
BUN SERPL-MCNC: 17 MG/DL (ref 8–27)
BUN/CREAT SERPL: 22 (ref 12–28)
CHLORIDE SERPL-SCNC: 98 MMOL/L (ref 96–106)
CO2 SERPL-SCNC: 25 MMOL/L (ref 20–29)
CREAT SERPL-MCNC: 0.76 MG/DL (ref 0.57–1)
GLUCOSE SERPL-MCNC: 95 MG/DL (ref 65–99)
POTASSIUM SERPL-SCNC: 4.1 MMOL/L (ref 3.5–5.2)
SODIUM SERPL-SCNC: 141 MMOL/L (ref 134–144)

## 2019-12-03 ENCOUNTER — TELEPHONE (OUTPATIENT)
Dept: FAMILY MEDICINE CLINIC | Age: 64
End: 2019-12-03

## 2019-12-03 NOTE — TELEPHONE ENCOUNTER
----- Message from Zee Barraza sent at 12/3/2019  2:34 PM EST -----  Regarding: / Telephone  General Message/Vendor Calls    Caller's first and last name:      Reason for call:  Mammogram     Callback required yes/no and why:  Yes, needs to get a mammogram scheduled.     Best contact number(s):  [phone]    Details to clarify the request:      Zee Barraza

## 2019-12-03 NOTE — TELEPHONE ENCOUNTER
Call placed to patient. Name and  verified.  Provided patient with central scheduling contact information to schedule her mammogram. She was appreciative of call

## 2020-01-24 ENCOUNTER — HOSPITAL ENCOUNTER (OUTPATIENT)
Dept: MAMMOGRAPHY | Age: 65
Discharge: HOME | End: 2020-01-24
Payer: COMMERCIAL

## 2020-01-24 DIAGNOSIS — Z12.31 VISIT FOR SCREENING MAMMOGRAM: ICD-10-CM

## 2020-01-24 PROCEDURE — 77063 BREAST TOMOSYNTHESIS BI: CPT

## 2020-03-31 DIAGNOSIS — I10 ESSENTIAL HYPERTENSION: ICD-10-CM

## 2020-03-31 DIAGNOSIS — E78.5 HYPERLIPIDEMIA, UNSPECIFIED HYPERLIPIDEMIA TYPE: ICD-10-CM

## 2020-03-31 NOTE — TELEPHONE ENCOUNTER
----- Message from Robbin Steen sent at 3/31/2020 11:14 AM EDT -----  Regarding: Dr. Fulton Mast: [phone]  Message: Pt is requesting a Rx refill on \"Hydrochlorothiazide 25 mg\", \"Atorvastatin Calcium 20 mg\" & \"[facility] Aspirin 81 mg\" to be called into [facility] at [phone]. Pt advised that she is completely out of medication.    Best Contact:[phone]

## 2020-04-01 RX ORDER — HYDROCHLOROTHIAZIDE 25 MG/1
TABLET ORAL
Qty: 90 TAB | Refills: 1 | Status: SHIPPED | OUTPATIENT
Start: 2020-04-01 | End: 2020-09-27

## 2020-04-01 RX ORDER — ATORVASTATIN CALCIUM 20 MG/1
20 TABLET, FILM COATED ORAL DAILY
Qty: 90 TAB | Refills: 1 | Status: SHIPPED | OUTPATIENT
Start: 2020-04-01 | End: 2020-09-27

## 2020-04-06 DIAGNOSIS — E78.5 HYPERLIPIDEMIA, UNSPECIFIED HYPERLIPIDEMIA TYPE: ICD-10-CM

## 2020-04-06 RX ORDER — GUAIFENESIN 100 MG/5ML
81 LIQUID (ML) ORAL DAILY
Qty: 90 TAB | Refills: 0 | Status: SHIPPED | OUTPATIENT
Start: 2020-04-06 | End: 2020-06-30

## 2020-04-06 NOTE — TELEPHONE ENCOUNTER
Please contact patient for scheduling. Thanks          Last visit 10/13/2019 MD Maci Romo   Next appointment None   Previous refill encounter(s) 10/13/2019 Aspirin #90 with 1 refill     Requested Prescriptions     Pending Prescriptions Disp Refills    aspirin 81 mg chewable tablet 90 Tab 0     Sig: Take 1 Tab by mouth daily.

## 2020-06-17 ENCOUNTER — OFFICE VISIT (OUTPATIENT)
Dept: PRIMARY CARE CLINIC | Age: 65
End: 2020-06-17

## 2020-06-17 DIAGNOSIS — Z20.822 EXPOSURE TO COVID-19 VIRUS: Primary | ICD-10-CM

## 2020-06-17 NOTE — PROGRESS NOTES
Patient was seen at [facility] thru flu clinic. Please seen scanned form for additional visit documentation. Patient consented to treatment. Going to take care of 86yo mother in [place].  Florian Oreilly 67 6/28. Wants to be tested prior to going. Asymptomatic    Appears well. VS reviewed    COVID testing obtained.

## 2020-06-19 ENCOUNTER — TELEPHONE (OUTPATIENT)
Dept: INTERNAL MEDICINE CLINIC | Age: 65
End: 2020-06-19

## 2020-06-19 LAB — SARS-COV-2, NAA: NOT DETECTED

## 2020-06-29 DIAGNOSIS — E78.5 HYPERLIPIDEMIA, UNSPECIFIED HYPERLIPIDEMIA TYPE: ICD-10-CM

## 2020-06-30 RX ORDER — GUAIFENESIN 100 MG/5ML
LIQUID (ML) ORAL
Qty: 90 TAB | Refills: 0 | Status: SHIPPED | OUTPATIENT
Start: 2020-06-30 | End: 2020-09-30 | Stop reason: SDUPTHER

## 2020-07-06 ENCOUNTER — TELEPHONE (OUTPATIENT)
Dept: FAMILY MEDICINE CLINIC | Age: 65
End: 2020-07-06

## 2020-09-30 DIAGNOSIS — E78.5 HYPERLIPIDEMIA, UNSPECIFIED HYPERLIPIDEMIA TYPE: ICD-10-CM

## 2020-09-30 NOTE — TELEPHONE ENCOUNTER
Pt is calling to req a refill    .   Requested Prescriptions     Pending Prescriptions Disp Refills    aspirin 81 mg chewable tablet 90 Tab 0       [facility]#[phone]

## 2020-09-30 NOTE — TELEPHONE ENCOUNTER
Last visit 10/25/2019 MD Brinda Ngo   Next appointment 10/28/2020 MD Brinda Ngo   Previous refill encounter(s) 06/30/2020 Aspirin #90     Requested Prescriptions     Pending Prescriptions Disp Refills    aspirin 81 mg chewable tablet 90 Tab 3     Sig: Take 1 Tab by mouth daily.

## 2020-10-06 ENCOUNTER — TELEPHONE (OUTPATIENT)
Dept: FAMILY MEDICINE CLINIC | Age: 65
End: 2020-10-06

## 2020-10-06 RX ORDER — GUAIFENESIN 100 MG/5ML
81 LIQUID (ML) ORAL DAILY
Qty: 90 TAB | Refills: 3 | Status: SHIPPED | OUTPATIENT
Start: 2020-10-06 | End: 2021-10-28 | Stop reason: SDUPTHER

## 2020-10-06 NOTE — TELEPHONE ENCOUNTER
MD Pepe Frye,    Patient calling again for refill of asa 81mg already pending from 9/30/20. Last fill 6/30/20 for 90. Appointment set for 10/28/20.       Please call her back with any issue:  [phone] Thanks, Carina Meneses

## 2020-10-23 DIAGNOSIS — I10 ESSENTIAL HYPERTENSION: ICD-10-CM

## 2020-10-23 DIAGNOSIS — E78.5 HYPERLIPIDEMIA, UNSPECIFIED HYPERLIPIDEMIA TYPE: ICD-10-CM

## 2020-10-26 RX ORDER — HYDROCHLOROTHIAZIDE 25 MG/1
TABLET ORAL
Qty: 30 TAB | Refills: 0 | Status: SHIPPED | OUTPATIENT
Start: 2020-10-26 | End: 2020-10-28 | Stop reason: SDUPTHER

## 2020-10-26 RX ORDER — ATORVASTATIN CALCIUM 20 MG/1
TABLET, FILM COATED ORAL
Qty: 30 TAB | Refills: 0 | Status: SHIPPED | OUTPATIENT
Start: 2020-10-26 | End: 2020-10-28 | Stop reason: SDUPTHER

## 2020-10-28 ENCOUNTER — OFFICE VISIT (OUTPATIENT)
Dept: FAMILY MEDICINE CLINIC | Age: 65
End: 2020-10-28
Payer: COMMERCIAL

## 2020-10-28 VITALS
SYSTOLIC BLOOD PRESSURE: 124 MMHG | BODY MASS INDEX: 44.88 KG/M2 | HEART RATE: 82 BPM | TEMPERATURE: 98.2 F | RESPIRATION RATE: 16 BRPM | OXYGEN SATURATION: 98 % | HEIGHT: 59 IN | DIASTOLIC BLOOD PRESSURE: 66 MMHG | WEIGHT: 222.6 LBS

## 2020-10-28 DIAGNOSIS — E78.49 OTHER HYPERLIPIDEMIA: ICD-10-CM

## 2020-10-28 DIAGNOSIS — Z23 NEEDS FLU SHOT: ICD-10-CM

## 2020-10-28 DIAGNOSIS — E66.01 OBESITY, MORBID (HCC): ICD-10-CM

## 2020-10-28 DIAGNOSIS — Z00.00 WELL WOMAN EXAM (NO GYNECOLOGICAL EXAM): Primary | ICD-10-CM

## 2020-10-28 DIAGNOSIS — I10 ESSENTIAL HYPERTENSION: ICD-10-CM

## 2020-10-28 DIAGNOSIS — E04.2 MULTINODULAR GOITER: ICD-10-CM

## 2020-10-28 PROCEDURE — 90686 IIV4 VACC NO PRSV 0.5 ML IM: CPT

## 2020-10-28 PROCEDURE — 90471 IMMUNIZATION ADMIN: CPT

## 2020-10-28 PROCEDURE — 99213 OFFICE O/P EST LOW 20 MIN: CPT | Performed by: FAMILY MEDICINE

## 2020-10-28 PROCEDURE — 99396 PREV VISIT EST AGE 40-64: CPT | Performed by: FAMILY MEDICINE

## 2020-10-28 RX ORDER — CHOLECALCIFEROL (VITAMIN D3) 125 MCG
CAPSULE ORAL
COMMUNITY

## 2020-10-28 RX ORDER — HYDROCHLOROTHIAZIDE 25 MG/1
25 TABLET ORAL DAILY
Qty: 90 TAB | Refills: 3 | Status: SHIPPED | OUTPATIENT
Start: 2020-10-28 | End: 2021-10-28 | Stop reason: SDUPTHER

## 2020-10-28 RX ORDER — ATORVASTATIN CALCIUM 20 MG/1
20 TABLET, FILM COATED ORAL DAILY
Qty: 90 TAB | Refills: 3 | Status: SHIPPED | OUTPATIENT
Start: 2020-10-28 | End: 2021-10-28 | Stop reason: SDUPTHER

## 2020-10-28 NOTE — PROGRESS NOTES
[facility] Note      Subjective:     Chief Complaint   Patient presents with    Complete Physical     Louise Schmidt is a 63y.o. year old female who presents for evaluation of the following:    HTN with HLD:   Key CAD CHF Meds             hydroCHLOROthiazide (HYDRODIURIL) 25 mg tablet (Taking) Take 1 Tab by mouth daily. atorvastatin (LIPITOR) 20 mg tablet (Taking) Take 1 Tab by mouth daily. aspirin 81 mg chewable tablet (Taking) Take 1 Tab by mouth daily. No home monitoring. Eating more vegetables  - Eating less chips   BP Readings from Last 3 Encounters:   10/28/20 124/66   10/25/19 129/74   08/14/19 126/65     Obesity:   Previous Attempts: Lost 50 lbs 2 years ago and regained all but 13lbs. Diet: Unrestricted previously but trying to eat healthy   Activity:  Walking  Last Weight Metrics:  Weight Loss Metrics 10/28/2020 10/25/2019 8/14/2019 5/24/2019 4/30/2019 2/6/2019 11/7/2018   Today's Wt 222 lb 9.6 oz 228 lb 225 lb 6.4 oz 230 lb 225 lb 3.2 oz 222 lb 228 lb 6.4 oz   BMI 44.96 kg/m2 46.05 kg/m2 45.53 kg/m2 46.45 kg/m2 45.48 kg/m2 44.84 kg/m2 46.13 kg/m2       Multinodular goiter   Diagnosed in 2013- outside imaging with 3 nodules on right and bilateral cysts.    Lab Results   Component Value Date/Time    TSH 1.220 10/24/2018 03:02 PM     Health Maintenance:   Diet: unrestricted   Activity: Walking  Last Weight Metrics:  Weight Loss Metrics 10/28/2020 10/25/2019 8/14/2019 5/24/2019 4/30/2019 2/6/2019 11/7/2018   Today's Wt 222 lb 9.6 oz 228 lb 225 lb 6.4 oz 230 lb 225 lb 3.2 oz 222 lb 228 lb 6.4 oz   BMI 44.96 kg/m2 46.05 kg/m2 45.53 kg/m2 46.45 kg/m2 45.48 kg/m2 44.84 kg/m2 46.13 kg/m2       Health Maintenance   Topic Date Due    Colorectal Cancer Screening Combo  09/21/2007    Flu Vaccine (1) 09/01/2020    Lipid Screen  10/25/2020    PAP AKA CERVICAL CYTOLOGY  07/09/2021    Breast Cancer Screen Mammogram  01/24/2022    Bone Densitometry (Dexa) Screening 09/21/2022    DTaP/Tdap/Td series (2 - Td) 10/25/2029    Hepatitis C Screening  Completed    Shingrix Vaccine Age 50>  Completed    Pneumococcal 0-64 years  Aged Out   - Colonoscopy done 1/2020 with [facility]    HIV or other STD testing: Declined  Domestic Violence Screen:   Abuse Screening Questionnaire 10/25/2019   Do you ever feel afraid of your partner? N   Are you in a relationship with someone who physically or mentally threatens you? N   Is it safe for you to go home? Y     Depression Screen:   3 most recent PHQ Screens 10/28/2020   Little interest or pleasure in doing things Not at all   Feeling down, depressed, irritable, or hopeless Not at all   Total Score PHQ 2 0     Smoker? Never    Pap:  Last 2018 NIL due 2021  Mammogram: Due  No LMP recorded. Patient is postmenopausal.    reports previously being sexually active. Social:   Works- retired postal   Lives alone. No children  - moved to [place] to be near her niece who is also a patient here       Review of Systems   Pertinent positives and negative per HPI. All other systems  reviewed are negative for a Comprehensive ROS (10+). Past Medical History:   Diagnosis Date    Menopause     LMP-2001? Social History     Socioeconomic History    Marital status: SINGLE     Spouse name: Not on file    Number of children: Not on file    Years of education: Not on file    Highest education level: Not on file   Occupational History    Not on file   Social Needs    Financial resource strain: Not on file    Food insecurity     Worry: Not on file     Inability: Not on file    Transportation needs     Medical: Not on file     Non-medical: Not on file   Tobacco Use    Smoking status: Never Smoker    Smokeless tobacco: Never Used   Substance and Sexual Activity    Alcohol use:  Yes     Alcohol/week: 2.0 standard drinks     Types: 1 Glasses of wine, 1 Cans of beer per week     Comment: occasinal    Drug use: No    Sexual activity: Not Currently   Lifestyle    Physical activity     Days per week: Not on file     Minutes per session: Not on file    Stress: Not on file   Relationships    Social connections     Talks on phone: Not on file     Gets together: Not on file     Attends [employer] service: Not on file     Active member of club or organization: Not on file     Attends meetings of clubs or organizations: Not on file     Relationship status: Not on file    Intimate partner violence     Fear of current or ex partner: Not on file     Emotionally abused: Not on file     Physically abused: Not on file     Forced sexual activity: Not on file   Other Topics Concern    Not on file   Social History Narrative    Not on file       Family History   Problem Relation Age of Onset    Heart Disease Mother     No Known Problems Father     Hypertension Sister        Current Outpatient Medications   Medication Sig    cholecalciferol, vitamin D3, (Vitamin D3) 50 mcg (2,000 unit) tab Take  by mouth.  hydroCHLOROthiazide (HYDRODIURIL) 25 mg tablet TAKE 1 TABLET BY MOUTH EVERY DAY    atorvastatin (LIPITOR) 20 mg tablet TAKE 1 TABLET BY MOUTH EVERY DAY    aspirin 81 mg chewable tablet Take 1 Tab by mouth daily.  multivitamin (ONE A DAY) tablet Take 1 Tab by mouth daily. No current facility-administered medications for this visit. Objective:     Vitals:    10/28/20 0945   BP: 124/66   Pulse: 82   Resp: 16   Temp: 98.2 °F (36.8 °C)   TempSrc: Oral   SpO2: 98%   Weight: 222 lb 9.6 oz (101 kg)   Height: 4' 11\" (1.499 m)       Physical Examination:  General: Alert, cooperative, no distress, appears stated age. Obese  Eyes: Conjunctivae clear. PERRL, EOMs intact. Ears: Normal external ear canals both ears. TM clear and mobile bilaterally   Nose: Nares normal. Septum midline. Mucosa normal. No drainage or sinus tenderness. Mouth/Throat: Lips, mucosa, and tongue normal. No oropharyngeal erythema. No tonsillar enlargement or exudate. Neck: Supple, symmetrical, trachea midline, no adenopathy. No thyroid enlargement/tenderness/nodules  Respiratory: Breathing comfortably, in no acute respiratory distress. Clear to auscultation bilaterally. Normal inspiratory and expiratory ratio. Cardiovascular: Regular rate and rhythm, S1, S2 normal, no murmur, click, rub or gallop.   -Extremities no edema. Pulses 2+ and symmetric radial and DP   Abdomen: Soft, non-tender, not distended. Bowel sounds normal. No masses or organomegaly. MSK: Extremities normal, atraumatic, no effusion. Gait steady and unassisted. Back symmetric, no curvature. ROM normal. No CVA tenderness. Skin: Skin color, texture, turgor normal. No rashes or lesions on exposed skin. Lymph nodes: Cervical, supraclavicular nodes normal.  Neurologic: CNII-XII intact. Strength 5/5 grossly. Sensation and reflexes normal throughout. Psychiatric: Affect appropriate. Mood euthymic. Thoughts logical. Speech volume and speed normal      No visits with results within 3 Month(s) from this visit. Latest known visit with results is:   Office Visit on 06/17/2020   Component Date Value Ref Range Status    SARS-CoV-2, KEREN 06/17/2020 Not Detected  Not Detected Final    Comment: This test was developed and its performance characteristics determined  by [facility]. This test has not been FDA cleared or  approved. This test has been authorized by FDA under an Emergency Use  Authorization (EUA). This test is only authorized for the duration of  time the declaration that circumstances exist justifying the  authorization of the emergency use of in vitro diagnostic tests for  detection of SARS-CoV-2 virus and/or diagnosis of COVID-19 infection  under section 564(b)(1) of the Act, 21 U. S.C. 918FYF-8(O)(2), unless  the authorization is terminated or revoked sooner.   When diagnostic testing is negative, the possibility of a false  negative result should be considered in the context of a patient's  recent exposures and the presence of clinical signs and symptoms  consistent with COVID-19. An individual without symptoms of COVID-19  and who is not shedding SARS-CoV-2 virus would expect to have a  negative (not detected) result in this assay. Assessment/ Plan:   Diagnoses and all orders for this visit:    1. Well woman exam (no gynecological exam)  -     CBC WITH AUTOMATED DIFF  -     METABOLIC PANEL, COMPREHENSIVE  -     LIPID PANEL    2. Essential hypertension  -     hydroCHLOROthiazide (HYDRODIURIL) 25 mg tablet; Take 1 Tab by mouth daily. 3. Other hyperlipidemia    4. Hyperlipidemia, unspecified hyperlipidemia type  -     atorvastatin (LIPITOR) 20 mg tablet; Take 1 Tab by mouth daily. 5. Obesity, morbid ([address]    6. Multinodular goiter  -     TSH REFLEX TO T4  -     US THYROID/PARATHYROID/SOFT TISS; Future    7. Needs flu shot  -     INFLUENZA VIRUS VAC QUAD,SPLIT,PRESV FREE SYRINGE IM    Other orders  -     CVD REPORT      PMH reviewed per orders. Labs to eval end organ function and etiology of chronic/acute concerns. Relevant vaccine, cancer screening and other health maintenance reviewed and updated per orders.   - Patient reports Colonoscopy done 1/2020 with [facility] to monitor multinodular goiter. Blood pressure well controlled. Continue current regimen. Refilled medications. Diet and lifestyle modification encouraged for weight loss. Negative depression screen. Educated patient on red flag symptoms to warrant return to clinic or emergency room visit. I have discussed the diagnosis with the patient and the intended plan as seen in the above orders. The patient has been offered or received an after-visit summary and questions were answered concerning future plans. I have discussed medication side effects and warnings with the patient as well. Follow-up and Dispositions    · Return in about 3 months (around 1/28/2021) for Follow Up weight and blood pressure. Signed,    Rody Castillo MD  10/28/2020

## 2020-10-28 NOTE — PROGRESS NOTES
Chief Complaint   Patient presents with    Complete Physical     Pt states she is having back pain off and on. Pt states she would like to have her memory checked. 1. Have you been to the ER, urgent care clinic since your last visit? Hospitalized since your last visit? No    2. Have you seen or consulted any other health care providers outside of the [address] [place] [address] since your last visit? Include any pap smears or colon screening. Yes When: Dr. Vickie Norwood in [facility] 7/10/20 for Well woman exam, Pt had bone density scan and US pelvic/abdominal 7/15/2020, Mammogram 2/2020  Colonoscopy 1/13/2020    Visit Vitals  /66 (BP 1 Location: Left arm, BP Patient Position: Sitting)   Pulse 82   Temp 98.2 °F (36.8 °C) (Oral)   Resp 16   Ht 4' 11\" (1.499 m)   Wt 222 lb 9.6 oz (101 kg)   SpO2 98%   BMI 44.96 kg/m²     Ordered Flu vaccine, per Dr. Melva Leyden, administered flu vaccine, pt tolerated well, VIS provided.

## 2020-10-28 NOTE — PATIENT INSTRUCTIONS
Weight Loss Tips:  Remember this is like a part time job so your motivation and commitment is key to your success. Mindset  Weight loss like any other behavior change starts in your mind. Think hard about what your motivates you to lose weight then meditate on that. Remind yourself of your motivation  with phone alarms, scheduled meditation time, vision board, journal- just to name a few ideas. Have realistic goals. We expect with diligent healthy diet and physical activity you can lose 5% of your body weight in 3  months. Wt in lbs x 0.05 = #lbs you should lose in 3 months. Make food and activity changes with a goal of CONSISTENCY not perfection. Food  Start eating differently. Most of your weight loss and gain is from what you eat. Use small plates only  Drink 2 liters (1/2 gallon) of water every day  HALF of every meal should be fruit or vegetables  Try meal prepping on Sunday (or your day off) with new different vegetables. Consider meal prep service such as Cleaneatz.com, wepremeals. com  Replace soda with diet soda or other zero sugar drinks (selter water just fine)  Consider using the Friendly Wager App sonja for calorie counting. Goal 4136-1871 calories per day    Activity  Staying physically active will help you lose more weight and can help you get over the plateau when you weight just  won't change any more with diet. Start exercise at least 5 days per week for 40 minutes. Consider [employer] training sonja for home exercises. You can start with walking. I suggest walking at a speed of at least 3.5-4.5mph to for the weight loss benefit. Increase your speed or distance every 2 weeks. Do some slow stretching daily of legs, arms and back. Consider adding weight training with light weights at home or at the gym. See a doctor or a physical training for  instructions in order to avoid injuries from doing muscle training incorrectly.   Free fitness program in [place]: [employer] org/program/fitness-warriors/         Learning About Eating More Fruits and Vegetables  What are some quick tips for eating more fruits and vegetables? We're all encouraged to eat more fruits and vegetables. Yet it can seem like one more chore on the daily to-do list. But you can add color and crunch to your meals--and lots of nutrition--with these quick tips. · Brighten up your breakfast.  ? Add sliced fruit or frozen berries to your yogurt, pancakes, or cereal.  ? Blend fresh or frozen fruit, veggies, and yogurt with a little fruit juice, and you've got a tasty smoothie. ? Make your scrambled eggs a gourmet treat by adding onions, celery, and bell peppers. ? Bake up some bran muffins with grated carrots added into the mix. · Make a livelier lunch. ? Jazz up tuna or chicken salad with apple chunks, celery, or grapes--or all of them! ? Add cucumbers, avocado slices, tomatoes, and lettuce to your sandwiches. ? Kick up the flavor of grilled cheese sandwiches with spinach and tomatoes. ? Puree some potatoes or squash to add to tomato soup. · Add delicious veggies to dinner. ? Give more color and taste to salads. Stir in red cabbage, carrots, and bell peppers. Top salads with dried cranberries or raisins. \"Frost\" your salad with orange sections or strawberries. ? Keep a bag or two of frozen vegetables ready to pull out of the freezer for a side dish. ? Spice up spaghetti and meatballs with mushrooms and bell peppers. ? Roast vegetables like cauliflower or squash in the oven with olive oil to bring out their flavor. ? Season your veggie dish with herbs like basil and nadya and a splash of lemon juice and olive oil. ? If you've got a main dish in the oven, stick in a potato to round out your meal.  · Grab some healthy snacks on the go. ? Scoop up an apple, banana, or plum for a quick snack. ? Cut up raw fruits and veggies to keep in your fridge.  Grapes, oranges, carrots, and celery are great choices. They'll be ready for a quick snack or an after-school treat. ? Dip raw vegetables in hummus or peanut butter. ? Keep dried fruit on hand for an easy \"take with you\" snack. · Make something sweet--and healthy. ? Try baked apples or pears topped with cinnamon and honey for a delicious dessert. ? Make chocolate chip cookies even better with grated carrots added to the mix. Where can you learn more? Go to http://www.gray.com/  Enter F050 in the search box to learn more about \"Learning About Eating More Fruits and Vegetables. \"  Current as of: August 22, 2019               Content Version: 12.6  © 6423-9287 [employer]. Care instructions adapted under license by [employer] (which disclaims liability or warranty for this information). If you have questions about a medical condition or this instruction, always ask your healthcare professional. [place] [address] any warranty or liability for your use of this information. A Healthy Lifestyle: Care Instructions  Your Care Instructions     A healthy lifestyle can help you feel good, stay at a healthy weight, and have plenty of energy for both work and play. A healthy lifestyle is something you can share with your whole family. A healthy lifestyle also can lower your risk for serious health problems, such as high blood pressure, heart disease, and diabetes. You can follow a few steps listed below to improve your health and the health of your family. Follow-up care is a key part of your treatment and safety. Be sure to make and go to all appointments, and call your doctor if you are having problems. It's also a good idea to know your test results and keep a list of the medicines you take. How can you care for yourself at home? · Do not eat too much sugar, fat, or fast foods. You can still have dessert and treats now and then. The goal is moderation.   · Start small to improve your eating habits. Pay attention to portion sizes, drink less juice and soda pop, and eat more fruits and vegetables. ? Eat a healthy amount of food. A 3-ounce serving of meat, for example, is about the size of a deck of cards. Fill the rest of your plate with vegetables and whole grains. ? Limit the amount of soda and sports drinks you have every day. Drink more water when you are thirsty. ? Eat at least 5 servings of fruits and vegetables every day. It may seem like a lot, but it is not hard to reach this goal. A serving or helping is 1 piece of fruit, 1 cup of vegetables, or 2 cups of leafy, raw vegetables. Have an apple or some carrot sticks as an afternoon snack instead of a candy bar. Try to have fruits and/or vegetables at every meal.  · Make exercise part of your daily routine. You may want to start with simple activities, such as walking, bicycling, or slow swimming. Try to be active 30 to 60 minutes every day. You do not need to do all 30 to 60 minutes all at once. For example, you can exercise 3 times a day for 10 or 20 minutes. Moderate exercise is safe for most people, but it is always a good idea to talk to your doctor before starting an exercise program.  · Keep moving. Wil Stake the lawn, work in the garden, or [employer]. Take the stairs instead of the elevator at work. · If you smoke, quit. People who smoke have an increased risk for heart attack, stroke, cancer, and other lung illnesses. Quitting is hard, but there are ways to boost your chance of quitting tobacco for good. ? Use nicotine gum, patches, or lozenges. ? Ask your doctor about stop-smoking programs and medicines. ? Keep trying. In addition to reducing your risk of diseases in the future, you will notice some benefits soon after you stop using tobacco. If you have shortness of breath or asthma symptoms, they will likely get better within a few weeks after you quit. · Limit how much alcohol you drink.  Moderate amounts of alcohol (up to 2 drinks a day for men, 1 drink a day for women) are okay. But drinking too much can lead to liver problems, high blood pressure, and other health problems. Family health  If you have a family, there are many things you can do together to improve your health. · Eat meals together as a family as often as possible. · Eat healthy foods. This includes fruits, vegetables, lean meats and dairy, and whole grains. · Include your family in your fitness plan. Most people think of activities such as jogging or tennis as the way to fitness, but there are many ways you and your family can be more active. Anything that makes you breathe hard and gets your heart pumping is exercise. Here are some tips:  ? Walk to do errands or to take your child to school or the bus.  ? Go for a family bike ride after dinner instead of watching TV. Where can you learn more? Go to http://www.gray.com/  Enter G412 in the search box to learn more about \"A Healthy Lifestyle: Care Instructions. \"  Current as of: January 31, 2020               Content Version: 12.6  © 4782-7302 [employer]. Care instructions adapted under license by [employer] (which disclaims liability or warranty for this information). If you have questions about a medical condition or this instruction, always ask your healthcare professional. [address] any warranty or liability for your use of this information. Vaccine Information Statement    Influenza (Flu) Vaccine (Inactivated or Recombinant): What You Need to Know    Many Vaccine Information Statements are available in Thai and other languages. See www.immunize.org/vis  Hojas de información sobre vacunas están disponibles en español y en muchos otros idiomas. Visite www.immunize.org/vis    1. Why get vaccinated? Influenza vaccine can prevent influenza (flu).     Flu is a contagious disease that spreads around the [place] every year, usually between October and May. Anyone can get the flu, but it is more dangerous for some people. Infants and young children, people 72years of age and older, pregnant women, and people with certain health conditions or a weakened immune system are at greatest risk of flu complications. Pneumonia, bronchitis, sinus infections and ear infections are examples of flu-related complications. If you have a medical condition, such as heart disease, cancer or diabetes, flu can make it worse. Flu can cause fever and chills, sore throat, muscle aches, fatigue, cough, headache, and runny or stuffy nose. Some people may have vomiting and diarrhea, though this is more common in children than adults. Each year thousands of people in the [facility] die from flu, and many more are hospitalized. Flu vaccine prevents millions of illnesses and flu-related visits to the doctor each year. 2. Influenza vaccines     CDC recommends everyone 10months of age and older get vaccinated every flu season. Children 6 months through 6years of age may need 2 doses during a single flu season. Everyone else needs only 1 dose each flu season. It takes about 2 weeks for protection to develop after vaccination. There are many flu viruses, and they are always changing. Each year a new flu vaccine is made to protect against three or four viruses that are likely to cause disease in the upcoming flu season. Even when the vaccine doesnt exactly match these viruses, it may still provide some protection. Influenza vaccine does not cause flu. Influenza vaccine may be given at the same time as other vaccines. 3. Talk with your health care provider    Tell your vaccine provider if the person getting the vaccine:   Has had an allergic reaction after a previous dose of influenza vaccine, or has any severe, life-threatening allergies.  Has ever had Guillain-Barré Syndrome (also called GBS).     In some cases, your health care provider may decide to postpone influenza vaccination to a future visit. People with minor illnesses, such as a cold, may be vaccinated. People who are moderately or severely ill should usually wait until they recover before getting influenza vaccine. Your health care provider can give you more information. 4. Risks of a reaction     Soreness, redness, and swelling where shot is given, fever, muscle aches, and headache can happen after influenza vaccine.  There may be a very small increased risk of Guillain-Barré Syndrome (GBS) after inactivated influenza vaccine (the flu shot). Pam Session children who get the flu shot along with pneumococcal vaccine (PCV13), and/or DTaP vaccine at the same time might be slightly more likely to have a seizure caused by fever. Tell your health care provider if a child who is getting flu vaccine has ever had a seizure. People sometimes faint after medical procedures, including vaccination. Tell your provider if you feel dizzy or have vision changes or ringing in the ears. As with any medicine, there is a very remote chance of a vaccine causing a severe allergic reaction, other serious injury, or death. 5. What if there is a serious problem? An allergic reaction could occur after the vaccinated person leaves the clinic. If you see signs of a severe allergic reaction (hives, swelling of the face and throat, difficulty breathing, a fast heartbeat, dizziness, or weakness), call 9-1-1 and get the person to the nearest hospital.    For other signs that concern you, call your health care provider. Adverse reactions should be reported to the Vaccine Adverse Event Reporting System (VAERS). Your health care provider will usually file this report, or you can do it yourself. Visit the VAERS website at www.vaers. hhs.gov or call 8-985.874.8222. VAERS is only for reporting reactions, and VAERS staff do not give medical advice.     6. The [facility] Vaccine Injury Compensation Program    The National Vaccine Injury Compensation Program (VICP) is a federal program that was created to compensate people who may have been injured by certain vaccines. Visit the VICP website at www.hrsa.gov/vaccinecompensation or call 8-709.956.5725 to learn about the program and about filing a claim. There is a time limit to file a claim for compensation. 7. How can I learn more?  Ask your health care provider.  Call your local or state health department.  Contact the Centers for Disease Control and Prevention (CDC):  - Call 6-806.975.3560 (1-800-CDC-INFO) or  - Visit CDCs influenza website at www.cdc.gov/flu    Vaccine Information Statement (Interim)  Inactivated Influenza Vaccine   8/15/2019  42 GEORGETTE Nick [id number]   Department of Health and Human Services  Centers for Disease Control and Prevention    Office Use Only

## 2020-10-29 LAB
ALBUMIN SERPL-MCNC: 4.1 G/DL (ref 3.8–4.8)
ALBUMIN/GLOB SERPL: 1.3 {RATIO} (ref 1.2–2.2)
ALP SERPL-CCNC: 131 IU/L (ref 39–117)
ALT SERPL-CCNC: 21 IU/L (ref 0–32)
AST SERPL-CCNC: 24 IU/L (ref 0–40)
BASOPHILS # BLD AUTO: 0 X10E3/UL (ref 0–0.2)
BASOPHILS NFR BLD AUTO: 0 %
BILIRUB SERPL-MCNC: 0.8 MG/DL (ref 0–1.2)
BUN SERPL-MCNC: 21 MG/DL (ref 8–27)
BUN/CREAT SERPL: 29 (ref 12–28)
CALCIUM SERPL-MCNC: 10 MG/DL (ref 8.7–10.3)
CHLORIDE SERPL-SCNC: 99 MMOL/L (ref 96–106)
CHOLEST SERPL-MCNC: 148 MG/DL (ref 100–199)
CO2 SERPL-SCNC: 26 MMOL/L (ref 20–29)
CREAT SERPL-MCNC: 0.73 MG/DL (ref 0.57–1)
EOSINOPHIL # BLD AUTO: 0.1 X10E3/UL (ref 0–0.4)
EOSINOPHIL NFR BLD AUTO: 1 %
ERYTHROCYTE [DISTWIDTH] IN BLOOD BY AUTOMATED COUNT: 13.3 % (ref 11.7–15.4)
GLOBULIN SER CALC-MCNC: 3.2 G/DL (ref 1.5–4.5)
GLUCOSE SERPL-MCNC: 84 MG/DL (ref 65–99)
HCT VFR BLD AUTO: 40.7 % (ref 34–46.6)
HDLC SERPL-MCNC: 71 MG/DL
HGB BLD-MCNC: 13.2 G/DL (ref 11.1–15.9)
IMM GRANULOCYTES # BLD AUTO: 0 X10E3/UL (ref 0–0.1)
IMM GRANULOCYTES NFR BLD AUTO: 0 %
INTERPRETATION, 910389: NORMAL
LDLC SERPL CALC-MCNC: 63 MG/DL (ref 0–99)
LYMPHOCYTES # BLD AUTO: 1.8 X10E3/UL (ref 0.7–3.1)
LYMPHOCYTES NFR BLD AUTO: 35 %
MCH RBC QN AUTO: 32 PG (ref 26.6–33)
MCHC RBC AUTO-ENTMCNC: 32.4 G/DL (ref 31.5–35.7)
MCV RBC AUTO: 99 FL (ref 79–97)
MONOCYTES # BLD AUTO: 0.4 X10E3/UL (ref 0.1–0.9)
MONOCYTES NFR BLD AUTO: 8 %
NEUTROPHILS # BLD AUTO: 2.7 X10E3/UL (ref 1.4–7)
NEUTROPHILS NFR BLD AUTO: 56 %
PLATELET # BLD AUTO: 334 X10E3/UL (ref 150–450)
POTASSIUM SERPL-SCNC: 4.2 MMOL/L (ref 3.5–5.2)
PROT SERPL-MCNC: 7.3 G/DL (ref 6–8.5)
RBC # BLD AUTO: 4.13 X10E6/UL (ref 3.77–5.28)
SODIUM SERPL-SCNC: 142 MMOL/L (ref 134–144)
TRIGL SERPL-MCNC: 70 MG/DL (ref 0–149)
TSH SERPL DL<=0.005 MIU/L-ACNC: 1.34 UIU/ML (ref 0.45–4.5)
VLDLC SERPL CALC-MCNC: 14 MG/DL (ref 5–40)
WBC # BLD AUTO: 5 X10E3/UL (ref 3.4–10.8)

## 2020-11-09 NOTE — PROGRESS NOTES
All of your results look good. There are no significant abnormalities.  [employer] result comment sent

## 2020-11-18 ENCOUNTER — OFFICE VISIT (OUTPATIENT)
Dept: FAMILY MEDICINE CLINIC | Age: 65
End: 2020-11-18
Payer: COMMERCIAL

## 2020-11-18 VITALS
OXYGEN SATURATION: 99 % | HEIGHT: 59 IN | WEIGHT: 223 LBS | SYSTOLIC BLOOD PRESSURE: 121 MMHG | TEMPERATURE: 97.4 F | DIASTOLIC BLOOD PRESSURE: 74 MMHG | RESPIRATION RATE: 18 BRPM | HEART RATE: 76 BPM | BODY MASS INDEX: 44.96 KG/M2

## 2020-11-18 DIAGNOSIS — Z72.820 POOR SLEEP: Primary | ICD-10-CM

## 2020-11-18 DIAGNOSIS — H92.01 DISCOMFORT OF RIGHT EAR: ICD-10-CM

## 2020-11-18 DIAGNOSIS — L30.4 INTERTRIGO: ICD-10-CM

## 2020-11-18 PROCEDURE — 99214 OFFICE O/P EST MOD 30 MIN: CPT | Performed by: FAMILY MEDICINE

## 2020-11-18 RX ORDER — KETOCONAZOLE 20 MG/G
CREAM TOPICAL DAILY
Qty: 60 G | Refills: 2 | Status: SHIPPED | OUTPATIENT
Start: 2020-11-18

## 2020-11-18 RX ORDER — NYSTATIN 100000 [USP'U]/G
POWDER TOPICAL 4 TIMES DAILY
Qty: 1 BOTTLE | Refills: 2 | Status: SHIPPED | OUTPATIENT
Start: 2020-11-18

## 2020-11-18 NOTE — PROGRESS NOTES
[facility] Note      Subjective:     Chief Complaint   Patient presents with    Ear Pain     right ear pain x 1 week      Whitney Little is a 63y.o. year old female who presents for evaluation of the following:      Ear Pain:  Onset 1 week ago   Soreness at the opening  No spntaouns eout  Cleans ear with washcloth  Denies drainging, ringing in ear, trauma to ear    Poor sleep:   Chronic intermittent, preset > 6 months ago  Poor sleep onset  Occurring 6 nights per week  TV off by 1230 130-230, woke to urinate, 6am-9am  Tx: melatonin  Denies anxiey  3 most recent PHQ Screens 11/18/2020   Little interest or pleasure in doing things Not at all   Feeling down, depressed, irritable, or hopeless Not at all   Total Score PHQ 2 0         Intertrigo:   Chronic > 6 months ago  Rash under breast   previsou treated withnysatin-trimcinoooka nd ystatin powder with  improvmen   - ran out      Social:   Works- retired postal   Lives alone. No children  - moved to [place] to be near her niece who is also a patient here       Review of Systems   Pertinent positives and negative per HPI. All other systems  reviewed are negative for a Comprehensive ROS (10+). Past Medical History:   Diagnosis Date    Menopause     LMP-2001? Social History     Socioeconomic History    Marital status: SINGLE     Spouse name: Not on file    Number of children: Not on file    Years of education: Not on file    Highest education level: Not on file   Occupational History    Not on file   Social Needs    Financial resource strain: Not on file    Food insecurity     Worry: Not on file     Inability: Not on file    Transportation needs     Medical: Not on file     Non-medical: Not on file   Tobacco Use    Smoking status: Never Smoker    Smokeless tobacco: Never Used   Substance and Sexual Activity    Alcohol use:  Yes     Alcohol/week: 2.0 standard drinks     Types: 1 Glasses of wine, 1 Cans of beer per week     Comment: occasinal    Drug use: No    Sexual activity: Not Currently   Lifestyle    Physical activity     Days per week: Not on file     Minutes per session: Not on file    Stress: Not on file   Relationships    Social connections     Talks on phone: Not on file     Gets together: Not on file     Attends Taoism service: Not on file     Active member of club or organization: Not on file     Attends meetings of clubs or organizations: Not on file     Relationship status: Not on file    Intimate partner violence     Fear of current or ex partner: Not on file     Emotionally abused: Not on file     Physically abused: Not on file     Forced sexual activity: Not on file   Other Topics Concern    Not on file   Social History Narrative    Not on file       Family History   Problem Relation Age of Onset    Heart Disease Mother     No Known Problems Father     Hypertension Sister        Current Outpatient Medications   Medication Sig    cholecalciferol, vitamin D3, (Vitamin D3) 50 mcg (2,000 unit) tab Take  by mouth.  hydroCHLOROthiazide (HYDRODIURIL) 25 mg tablet Take 1 Tab by mouth daily.  atorvastatin (LIPITOR) 20 mg tablet Take 1 Tab by mouth daily.  aspirin 81 mg chewable tablet Take 1 Tab by mouth daily.  multivitamin (ONE A DAY) tablet Take 1 Tab by mouth daily. No current facility-administered medications for this visit. Objective:     Vitals:    11/18/20 0958   BP: 121/74   Pulse: 76   Resp: 18   Temp: 97.4 °F (36.3 °C)   TempSrc: Temporal   SpO2: 99%   Weight: 223 lb (101.2 kg)   Height: 4' 11\" (1.499 m)       Physical Examination:  General: Alert, cooperative, no distress, appears stated age. Obese  Eyes: Conjunctivae clear. PERRL, EOMs intact. Ears: Normal external ear canals both ears. TM clear and mobile bilaterally   Nose: Nares normal. Septum midline. Mucosa normal. No drainage or sinus tenderness.   Mouth/Throat: Lips, mucosa, and tongue normal. No oropharyngeal erythema. No tonsillar enlargement or exudate. Neck: Supple, symmetrical, trachea midline, no adenopathy. No thyroid enlargement/tenderness/nodules  Respiratory: Breathing comfortably, in no acute respiratory distress. Clear to auscultation bilaterally. Normal inspiratory and expiratory ratio. Cardiovascular: Regular rate and rhythm, S1, S2 normal, no murmur, click, rub or gallop.   -Extremities no edema. Pulses 2+ and symmetric radial and DP   Abdomen: Soft, non-tender, not distended. Bowel sounds normal. No masses or organomegaly. MSK: Extremities normal, atraumatic, no effusion. Gait steady and unassisted. Back symmetric, no curvature. ROM normal. No CVA tenderness. Skin: Mammary folds with raised erythema  Lymph nodes: Cervical, supraclavicular nodes normal.  Neurologic: CNII-XII intact. Strength 5/5 grossly. Sensation and reflexes normal throughout. Psychiatric: Affect appropriate. Mood euthymic. Thoughts logical. Speech volume and speed normal      Office Visit on 10/28/2020   Component Date Value Ref Range Status    WBC 10/28/2020 5.0  3.4 - 10.8 x10E3/uL Final    RBC 10/28/2020 4.13  3.77 - 5.28 x10E6/uL Final    HGB 10/28/2020 13.2  11.1 - 15.9 g/dL Final    HCT 10/28/2020 40.7  34.0 - 46.6 % Final    MCV 10/28/2020 99* 79 - 97 fL Final    MCH 10/28/2020 32.0  26.6 - 33.0 pg Final    MCHC 10/28/2020 32.4  31.5 - 35.7 g/dL Final    RDW 10/28/2020 13.3  11.7 - 15.4 % Final    PLATELET 21/83/7087 094  150 - 450 x10E3/uL Final    NEUTROPHILS 10/28/2020 56  Not Estab. % Final    Lymphocytes 10/28/2020 35  Not Estab. % Final    MONOCYTES 10/28/2020 8  Not Estab. % Final    EOSINOPHILS 10/28/2020 1  Not Estab. % Final    BASOPHILS 10/28/2020 0  Not Estab. % Final    ABS. NEUTROPHILS 10/28/2020 2.7  1.4 - 7.0 x10E3/uL Final    Abs Lymphocytes 10/28/2020 1.8  0.7 - 3.1 x10E3/uL Final    ABS. MONOCYTES 10/28/2020 0.4  0.1 - 0.9 x10E3/uL Final    ABS.  EOSINOPHILS 10/28/2020 0.1  0.0 - 0.4 x10E3/uL Final    ABS. BASOPHILS 10/28/2020 0.0  0.0 - 0.2 x10E3/uL Final    IMMATURE GRANULOCYTES 10/28/2020 0  Not Estab. % Final    ABS. IMM. GRANS. 10/28/2020 0.0  0.0 - 0.1 x10E3/uL Final    Glucose 10/28/2020 84  65 - 99 mg/dL Final    BUN 10/28/2020 21  8 - 27 mg/dL Final    Creatinine 10/28/2020 0.73  0.57 - 1.00 mg/dL Final    GFR est non-AA 10/28/2020 88  >59 mL/min/1.73 Final    GFR est AA 10/28/2020 101  >59 mL/min/1.73 Final    BUN/Creatinine ratio 10/28/2020 29* 12 - 28 Final    Sodium 10/28/2020 142  134 - 144 mmol/L Final    Potassium 10/28/2020 4.2  3.5 - 5.2 mmol/L Final    Chloride 10/28/2020 99  96 - 106 mmol/L Final    CO2 10/28/2020 26  20 - 29 mmol/L Final    Calcium 10/28/2020 10.0  8.7 - 10.3 mg/dL Final    Protein, total 10/28/2020 7.3  6.0 - 8.5 g/dL Final    Albumin 10/28/2020 4.1  3.8 - 4.8 g/dL Final    GLOBULIN, TOTAL 10/28/2020 3.2  1.5 - 4.5 g/dL Final    A-G Ratio 10/28/2020 1.3  1.2 - 2.2 Final    Bilirubin, total 10/28/2020 0.8  0.0 - 1.2 mg/dL Final    Alk. phosphatase 10/28/2020 131* 39 - 117 IU/L Final    AST (SGOT) 10/28/2020 24  0 - 40 IU/L Final    ALT (SGPT) 10/28/2020 21  0 - 32 IU/L Final    Cholesterol, total 10/28/2020 148  100 - 199 mg/dL Final    Triglyceride 10/28/2020 70  0 - 149 mg/dL Final    HDL Cholesterol 10/28/2020 71  >39 mg/dL Final    VLDL Cholesterol Damaso 10/28/2020 14  5 - 40 mg/dL Final    LDL Chol Calc (NIH) 10/28/2020 63  0 - 99 mg/dL Final    TSH 10/28/2020 1.340  0.450 - 4.500 uIU/mL Final    INTERPRETATION 10/28/2020 Note   Final    Supplemental report is available. Assessment/ Plan:   Diagnoses and all orders for this visit:    1. Poor sleep    2. Intertrigo  -     ketoconazole (NIZORAL) 2 % topical cream; Apply  to affected area daily. -     nystatin (MYCOSTATIN) powder; Apply  to affected area four (4) times daily. 3. Discomfort of right ear      For sleep, chronic intermittent.   Advised trial CBT with therapy which patient declined. May trial OTC  tylneol pM, unisome. , zzquil,  melatonin 15 mg nightly as needed. Refilled topical antifungal for intertrigo under breasts. Discomfort of ear with no physical exam findings to correlate. Discomfort is improving with time. Return to clinic if not resolved in 1 to 2 weeks. Educated patient on red flag symptoms to warrant return to clinic or emergency room visit. I have discussed the diagnosis with the patient and the intended plan as seen in the above orders. The patient has been offered or received an after-visit summary and questions were answered concerning future plans. I have discussed medication side effects and warnings with the patient as well. Follow-up and Dispositions    · Return in about 3 months (around 2/18/2021) for Follow Up weight, sleep.        Signed,    José Posada MD  11/18/2020

## 2020-11-18 NOTE — PATIENT INSTRUCTIONS
Follow up with a behavioral therapist for evaluation and management of your mood. If you do not already see a therapist, you can find a list through [employer] by calling the mental help line number on the back of your insurance card. Here are a few local providers: Yousif Rodriguez [phone]  Gerber Bloom [phone]  [facility]: [address] [facility]  [phone] (can prescribe medications)  [address] [place] [address] [phone] (can prescribe medications)  [address] [place] [address] [phone] (can prescribe medications)  [employer]         Learning About Sleeping Well  What does sleeping well mean? Sleeping well means getting enough sleep. How much sleep is enough varies among people. The number of hours you sleep is not as important as how you feel when you wake up. If you do not feel refreshed, you probably need more sleep. Another sign of not getting enough sleep is feeling tired during the day. The average total nightly sleep time is 7½ to 8 hours. Healthy adults may need a little more or a little less than this. Why is getting enough sleep important? Getting enough quality sleep is a basic part of good health. When your sleep suffers, your mood and your thoughts can suffer too. You may find yourself feeling more grumpy or stressed. Not getting enough sleep also can lead to serious problems, including injury, accidents, anxiety, and depression. What might cause poor sleeping? Many things can cause sleep problems, including:  · Stress. Stress can be caused by fear about a single event, such as giving a speech. Or you may have ongoing stress, such as worry about work or school. · Depression, anxiety, and other mental or emotional conditions. · Changes in your sleep habits or surroundings. This includes changes that happen where you sleep, such as noise, light, or sleeping in a different bed.  It also includes changes in your sleep pattern, such as having jet lag or working a late shift. · Health problems, such as pain, breathing problems, and restless legs syndrome. · Lack of regular exercise. How can you help yourself? Here are some tips that may help you sleep more soundly and wake up feeling more refreshed. Your sleeping area   · Use your bedroom only for sleeping and sex. A bit of light reading may help you fall asleep. But if it doesn't, do your reading elsewhere in the house. Don't watch TV in bed. · Be sure your bed is big enough to stretch out comfortably, especially if you have a sleep partner. · Keep your bedroom quiet, dark, and cool. Use curtains, blinds, or a sleep mask to block out light. To block out noise, use earplugs, soothing music, or a \"white noise\" machine. Your evening and bedtime routine   · Create a relaxing bedtime routine. You might want to take a warm shower or bath, listen to soothing music, or drink a cup of noncaffeinated tea. · Go to bed at the same time every night. And get up at the same time every morning, even if you feel tired. What to avoid   · Limit caffeine (coffee, tea, caffeinated sodas) during the day, and don't have any for at least 4 to 6 hours before bedtime. · Don't drink alcohol before bedtime. Alcohol can cause you to wake up more often during the night. · Don't smoke or use tobacco, especially in the evening. Nicotine can keep you awake. · Don't take naps during the day, especially close to bedtime. · Don't lie in bed awake for too long. If you can't fall asleep, or if you wake up in the middle of the night and can't get back to sleep within 15 minutes or so, get out of bed and go to another room until you feel sleepy. · Don't take medicine right before bed that may keep you awake or make you feel hyper or energized. Your doctor can tell you if your medicine may do this and if you can take it earlier in the day. If you can't sleep   · Imagine yourself in a peaceful, pleasant scene.  Focus on the details and feelings of being in a place that is relaxing. · Get up and do a quiet or boring activity until you feel sleepy. · Don't drink any liquids after 6 p.m. if you wake up often because you have to go to the bathroom. Where can you learn more? Go to http://www.gray.com/  Enter T190 in the search box to learn more about \"Learning About Sleeping Well. \"  Current as of: January 31, 2020               Content Version: 12.6  © 2006-2020 [employer]. Care instructions adapted under license by [employer] (which disclaims liability or warranty for this information). If you have questions about a medical condition or this instruction, always ask your healthcare professional. [address] any warranty or liability for your use of this information.

## 2020-11-18 NOTE — PROGRESS NOTES
Chief Complaint   Patient presents with    Ear Pain     right ear pain x 1 week      1. Have you been to the ER, urgent care clinic since your last visit? Hospitalized since your last visit? No    2. Have you seen or consulted any other health care providers outside of the [address] [place] [address] since your last visit? Include any pap smears or colon screening.  No

## 2021-01-05 ENCOUNTER — TRANSCRIBE ORDER (OUTPATIENT)
Dept: SCHEDULING | Age: 66
End: 2021-01-05

## 2021-01-05 DIAGNOSIS — Z12.31 VISIT FOR SCREENING MAMMOGRAM: Primary | ICD-10-CM

## 2021-01-08 ENCOUNTER — TELEPHONE (OUTPATIENT)
Dept: FAMILY MEDICINE CLINIC | Age: 66
End: 2021-01-08

## 2021-01-08 NOTE — TELEPHONE ENCOUNTER
----- Message from Tali Pinto sent at 1/8/2021  2:21 PM EST -----  Regarding: Dr. Jasmin Wilkinson Message/Vendor Calls    Caller's first and last name: pt      Reason for call: Question      Callback required yes/no and why: yes, to discuss      Best contact number(s):   [phone]      Details to clarify the request: would like to discuss a letter she received in November about a procedure.       Tali Pinto

## 2021-01-11 NOTE — TELEPHONE ENCOUNTER
Outbound call to patient. Patient received a letter stating that she needed her thyroid rechecked. Advised patient that letter may have been sent in error. According to her chart last labs for thyroid dated on 10/28/20 came back normal and no thyroid recheck is needed at this time. Patient verbalized understanding.

## 2021-01-25 ENCOUNTER — HOSPITAL ENCOUNTER (OUTPATIENT)
Dept: MAMMOGRAPHY | Age: 66
Discharge: HOME | End: 2021-01-25
Payer: COMMERCIAL

## 2021-01-25 DIAGNOSIS — Z12.31 VISIT FOR SCREENING MAMMOGRAM: ICD-10-CM

## 2021-01-25 PROCEDURE — 77063 BREAST TOMOSYNTHESIS BI: CPT

## 2021-04-12 ENCOUNTER — OFFICE VISIT (OUTPATIENT)
Dept: FAMILY MEDICINE CLINIC | Age: 66
End: 2021-04-12
Payer: COMMERCIAL

## 2021-04-12 VITALS
SYSTOLIC BLOOD PRESSURE: 118 MMHG | BODY MASS INDEX: 44.07 KG/M2 | DIASTOLIC BLOOD PRESSURE: 76 MMHG | WEIGHT: 218.6 LBS | HEIGHT: 59 IN | HEART RATE: 57 BPM | OXYGEN SATURATION: 100 % | TEMPERATURE: 97.3 F | RESPIRATION RATE: 18 BRPM

## 2021-04-12 DIAGNOSIS — R00.1 BRADYCARDIA: ICD-10-CM

## 2021-04-12 DIAGNOSIS — Z71.3 WEIGHT LOSS COUNSELING, ENCOUNTER FOR: ICD-10-CM

## 2021-04-12 DIAGNOSIS — E04.2 MULTINODULAR GOITER: ICD-10-CM

## 2021-04-12 DIAGNOSIS — E66.01 OBESITY, MORBID (HCC): ICD-10-CM

## 2021-04-12 DIAGNOSIS — I10 ESSENTIAL HYPERTENSION: Primary | ICD-10-CM

## 2021-04-12 DIAGNOSIS — E78.49 OTHER HYPERLIPIDEMIA: ICD-10-CM

## 2021-04-12 PROCEDURE — 99214 OFFICE O/P EST MOD 30 MIN: CPT | Performed by: FAMILY MEDICINE

## 2021-04-12 NOTE — PROGRESS NOTES
Chief Complaint   Patient presents with    Cholesterol Problem    Hypertension     1. Have you been to the ER, urgent care clinic since your last visit? Hospitalized since your last visit? No    2. Have you seen or consulted any other health care providers outside of the [address] [place] [address] since your last visit? Include any pap smears or colon screening.  No     3 most recent PHQ Screens 4/12/2021   Little interest or pleasure in doing things Not at all   Feeling down, depressed, irritable, or hopeless Not at all   Total Score PHQ 2 0

## 2021-04-12 NOTE — PATIENT INSTRUCTIONS
Weight Loss Tips:  Remember this is like a part time job so your motivation and commitment is key to your success. Mindset  Weight loss like any other behavior change starts in your mind. Think hard about what your motivates you to lose weight then meditate on that. Remind yourself of your motivation  with phone alarms, scheduled meditation time, vision board, journal- just to name a few ideas. Have realistic goals. We expect with diligent healthy diet and physical activity you can lose 5% of your body weight in 3  months. Wt in lbs x 0.05 = #lbs you should lose in 3 months. Make food and activity changes with a goal of CONSISTENCY not perfection. Food  Start eating differently. Most of your weight loss and gain is from what you eat. Use small plates only  Drink 2 liters (1/2 gallon) of water every day  HALF of every meal should be fruit or vegetables  Try meal prepping on Sunday (or your day off) with new different vegetables. Consider meal prep service such as Cle[employer], wepremeals. com  Replace soda with diet soda or other zero sugar drinks (selter water just fine)  Consider using the [employer] sonja for calorie counting. Goal 0927-4946 calories per day    Activity  Staying physically active will help you lose more weight and can help you get over the plateau when you weight just  won't change any more with diet. Start exercise at least 5 days per week for 40 minutes. Consider [employer] training sonja for home exercises. You can start with walking. I suggest walking at a speed of at least 3.5-4.5mph to for the weight loss benefit. Increase your speed or distance every 2 weeks. Do some slow stretching daily of legs, arms and back. Consider adding weight training with light weights at home or at the gym. See a doctor or a physical training for  instructions in order to avoid injuries from doing muscle training incorrectly.   Free fitness program in [place]: [employer]/program/fitness-warriors/         Learning About Eating More Fruits and Vegetables  What are some quick tips for eating more fruits and vegetables? We're all encouraged to eat more fruits and vegetables. Yet it can seem like one more chore on the daily to-do list. But you can add color and crunch to your meals--and lots of nutrition--with these quick tips. · Brighten up your breakfast.  ? Add sliced fruit or frozen berries to your yogurt, pancakes, or cereal.  ? Blend fresh or frozen fruit, veggies, and yogurt with a little fruit juice, and you've got a tasty smoothie. ? Make your scrambled eggs a gourmet treat by adding onions, celery, and bell peppers. ? Bake up some bran muffins with grated carrots added into the mix. · Make a livelier lunch. ? Jazz up tuna or chicken salad with apple chunks, celery, or grapes--or all of them! ? Add cucumbers, avocado slices, tomatoes, and lettuce to your sandwiches. ? Kick up the flavor of grilled cheese sandwiches with spinach and tomatoes. ? Puree some potatoes or squash to add to tomato soup. · Add delicious veggies to dinner. ? Give more color and taste to salads. Stir in red cabbage, carrots, and bell peppers. Top salads with dried cranberries or raisins. \"Frost\" your salad with orange sections or strawberries. ? Keep a bag or two of frozen vegetables ready to pull out of the freezer for a side dish. ? Spice up spaghetti and meatballs with mushrooms and bell peppers. ? Roast vegetables like cauliflower or squash in the oven with olive oil to bring out their flavor. ? Season your veggie dish with herbs like basil and nadya and a splash of lemon juice and olive oil. ? If you've got a main dish in the oven, stick in a potato to round out your meal.  · Grab some healthy snacks on the go. ? Scoop up an apple, banana, or plum for a quick snack. ? Cut up raw fruits and veggies to keep in your fridge.  Grapes, oranges, carrots, and celery are great choices. They'll be ready for a quick snack or an after-school treat. ? Dip raw vegetables in hummus or peanut butter. ? Keep dried fruit on hand for an easy \"take with you\" snack. · Make something sweet--and healthy. ? Try baked apples or pears topped with cinnamon and honey for a delicious dessert. ? Make chocolate chip cookies even better with grated carrots added to the mix. Where can you learn more? Go to http://www.gray.com/  Enter F050 in the search box to learn more about \"Learning About Eating More Fruits and Vegetables. \"  Current as of: December 17, 2020               Content Version: 12.8  © 2006-2021 [employer]. Care instructions adapted under license by [employer] (which disclaims liability or warranty for this information). If you have questions about a medical condition or this instruction, always ask your healthcare professional. [place] [address] any warranty or liability for your use of this information. Stretching: Exercises  Introduction  Here are some examples of exercises for stretching. Start each exercise slowly. Ease off the exercise if you start to have pain. Your doctor or physical therapist will tell you when you can start these exercises and which ones will work best for you. How to do the exercises  Latissimus stretch   1. Stand with your back straight and your feet shoulder-width apart. You can do this stretch sitting down if you are not steady on your feet. 2. Hold your arms above your head, and hold one hand with the other. 3. Pull upward while leaning straight over toward your right side. Keep your lower body straight. You should feel the stretch along your left side. 4. Hold 15 to 30 seconds, and then switch sides. 5. Repeat 2 to 4 times for each side. Triceps stretch   1. Stand with your back straight and your feet shoulder-width apart.  You can do this stretch sitting down if you are not steady on your feet. 2. Bring your left elbow straight up while bending your arm. 3. Grab your left elbow with your right hand, and pull your left elbow toward your head with light pressure. If you are more flexible, you may pull your arm slightly behind your head. You will feel the stretch along the back of your arm. 4. Hold 15 to 30 seconds, and then switch elbows. 5. Repeat 2 to 4 times for each arm. Calf stretch   1. Place your hands on a wall for balance. You can also do this with your hands on the back of a chair, a countertop, or a tree. 2. Step back with your left leg. Keep the leg straight, and press your left heel into the floor. 3. Press your hips forward, bending your right leg slightly. You will feel the stretch in your left calf. 4. Hold the stretch 15 to 30 seconds. 5. Repeat 2 to 4 times for each leg. Quadriceps stretch   1. Lie on your side with one hand supporting your head. 2. Bend your upper leg back and grab your ankle with your other hand. 3. Stretch your leg back by pulling your foot toward your buttocks. You will feel the stretch in the front of your thigh. If this causes stress on your knees, do not do this stretch. 4. Hold the stretch 15 to 30 seconds. 5. Repeat 2 to 4 times for each leg. Groin stretch   1. Sit on the floor and put the soles of your feet together. Do not slump your back. 2. Grab your ankles and gently pull your legs toward you. 3. Press your knees toward the floor. You will feel the stretch in your inner thighs. 4. Hold 15 to 30 seconds. 5. Repeat 2 to 4 times. Hamstring stretch in doorway   1. Lie on the floor near a doorway, with your buttocks close to the wall. 2. Let the leg you are not stretching extend through the doorway. 3. Put the leg you want to stretch up on the wall, and straighten your knee to feel a gentle stretch at the back of your leg. 4. Hold the stretch for at least 15 to 30 seconds. Repeat 2 to 4 times. Follow-up care is a key part of your treatment and safety. Be sure to make and go to all appointments, and call your doctor if you are having problems. It's also a good idea to know your test results and keep a list of the medicines you take. Where can you learn more? Go to http://www.gray.com/  Enter P733 in the search box to learn more about \"Stretching: Exercises. \"  Current as of: September 10, 2020               Content Version: 12.8  © 2006-2021 [employer]. Care instructions adapted under license by [employer] (which disclaims liability or warranty for this information). If you have questions about a medical condition or this instruction, always ask your healthcare professional. [address] any warranty or liability for your use of this information.

## 2021-04-12 NOTE — PROGRESS NOTES
[address] [facility] Note    Assessment/ Plan:   Diagnoses and all orders for this visit:    1. Essential hypertension    2. Other hyperlipidemia    3. Multinodular goiter    4. Obesity, morbid ([address]    5. Weight loss counseling, encounter for    6. Bradycardia      Recommendations based on history, physical exam and review of pertinent labs, studies and medications:   Blood pressure is well controlled. Continue current regimen. DASH Diet recommended. Recheck in office in 1 month. Mild bradycardia, asymptomatic. Continue to monitor at follow-up visit and if persistently low will get EKG to confirm sinus rhythm. Chronic multinodular goiter. Last imaged 2013. Ultrasound ordered last visit but not completed yet. Provided patient with imaging scheduling information. Chronic hyperlipidemia, currently well controlled on statin. Continue current regimen. Morbid obesity, uncontrolled. Slightly improved with recent diet modification. Diet and lifestyle modification encouraged for weight loss and chronic disease prevention/ management. Declined medications for obesity management. Follow up with specialists per routine. Educated patient on red flag symptoms to warrant return to clinic or emergency room visit. I have discussed the diagnosis with the patient and the intended plan as seen in the above orders. The patient has been offered or received an after-visit summary and questions were answered concerning future plans. I have discussed medication side effects and warnings with the patient as well. Follow-up and Dispositions    · Return in about 6 months (around 10/12/2021) for Follow Up, Physical exam.       Subjective:     Chief Complaint   Patient presents with    Cholesterol Problem    Hypertension     Mars Kumar is a 63y.o. year old female who presents for evaluation of the following:    Hypertension with HLD:   No home monitoring.    Eating more vegetables  - Eating less chips   Key CAD CHF Meds             hydroCHLOROthiazide (HYDRODIURIL) 25 mg tablet (Taking) Take 1 Tab by mouth daily. atorvastatin (LIPITOR) 20 mg tablet (Taking) Take 1 Tab by mouth daily. aspirin 81 mg chewable tablet (Taking) Take 1 Tab by mouth daily. BP Readings from Last 3 Encounters:   04/12/21 118/76   11/18/20 121/74   10/28/20 124/66     Lab Results   Component Value Date/Time    Cholesterol, total 148 10/28/2020 10:43 AM    HDL Cholesterol 71 10/28/2020 10:43 AM    LDL, calculated 63 10/28/2020 10:43 AM    LDL, calculated 61 10/25/2019 10:02 AM    VLDL, calculated 14 10/28/2020 10:43 AM    VLDL, calculated 10 10/25/2019 10:02 AM    Triglyceride 70 10/28/2020 10:43 AM         Obesity:   Perception: Losing weight after mickey fast at [employer]  Previous Attempts:   1. Lost 50 lbs around 2018 and regained all but 13lbs. 2. Prateek Sebastian fast  Diet: Eating meat twice per week, increased fruit and vegetables, brown rice,  - last night- pot roast, spinach, mac-n-cheese  Activity:  Dancing with Neema videos  Last Weight Metrics:  Weight Loss Metrics 4/12/2021 11/18/2020 10/28/2020 10/25/2019 8/14/2019 5/24/2019 4/30/2019   Today's Wt 218 lb 9.6 oz 223 lb 222 lb 9.6 oz 228 lb 225 lb 6.4 oz 230 lb 225 lb 3.2 oz   BMI 44.15 kg/m2 45.04 kg/m2 44.96 kg/m2 46.05 kg/m2 45.53 kg/m2 46.45 kg/m2 45.48 kg/m2       Multinodular goiter   Diagnosed in 2013- outside imaging with 3 nodules on right and bilateral cysts. Repeat imaging  ordered or not competed  Lab Results   Component Value Date/Time    TSH 1.340 10/28/2020 10:43 AM    TSH 1.220 10/24/2018 03:02 PM       Low Pulse   Not on beta blocker  Denies chest pain ,fatigue, palpitations  Pulse Readings from Last 3 Encounters:   04/12/21 (!) 57   11/18/20 76   10/28/20 82         Review of Systems   Pertinent positives and negative per HPI. All other systems  reviewed are negative for a Comprehensive ROS (10+).      Past medical history, social history, family history reviewed. Medications reconciled. Objective:     Vitals:    04/12/21 0820   BP: 118/76   Pulse: (!) 57   Resp: 18   Temp: 97.3 °F (36.3 °C)   TempSrc: Temporal   SpO2: 100%   Weight: 218 lb 9.6 oz (99.2 kg)   Height: 4' 11\" (1.499 m)       Physical Examination:  General: Alert, cooperative, no distress, appears stated age. Obese  Eyes: Conjunctivae clear. Pupils equally round and reactive to light, Extraocular muscles intact. Ears: Normal external ear canals both ears. Nose: Nares normal. Septum midline. Mucosa normal. No drainage or sinus tenderness. Mouth/Throat: Lips, mucosa, and tongue normal. No oropharyngeal erythema. No tonsillar enlargement or exudate. Neck: Supple, symmetrical, trachea midline, no adenopathy. No thyroid enlargement/tenderness/nodules  Respiratory: Breathing comfortably, in no acute respiratory distress. Clear to auscultation bilaterally. Normal inspiratory and expiratory ratio. Cardiovascular: Regular rate and rhythm, S1, S2 normal, no murmur, click, rub or gallop.   -Extremities no edema. Pulses 2+ and symmetric radial and dorsalis pedis   Abdomen: Soft, non-tender, not distended. Bowel sounds normal.  MSK: Extremities normal appearing, atraumatic, no effusion. Gait steady and unassisted. Skin: Skin color, texture, turgor normal. No rashes or lesions on exposed skin. Lymph nodes: Cervical, supraclavicular nodes normal.  Neurologic: Cranial nerves II-XII intact. Psychiatric: Affect appropriat. Mood euthymic.  Thoughts logical. Speech volume and speed normal      Signed,    Jorge Inman MD  4/12/2021

## 2021-04-13 ENCOUNTER — TELEPHONE (OUTPATIENT)
Dept: FAMILY MEDICINE CLINIC | Age: 66
End: 2021-04-13

## 2021-04-13 NOTE — TELEPHONE ENCOUNTER
Patient called inquiring about a test she was advised to have done. She states she misplaced or was not given the summary sheet. She is requesting to have the summary mailed to her home addressed (address was verified).     Requesting a call back      [facility]#[phone]

## 2021-04-13 NOTE — TELEPHONE ENCOUNTER
Outbound call to patient. Patient request paper copy of after visit summary be mailed to home address on file. Understanding was verbalized.

## 2021-04-20 ENCOUNTER — HOSPITAL ENCOUNTER (OUTPATIENT)
Dept: ULTRASOUND IMAGING | Age: 66
Discharge: HOME | End: 2021-04-20
Payer: COMMERCIAL

## 2021-04-20 DIAGNOSIS — E04.2 MULTINODULAR GOITER: ICD-10-CM

## 2021-04-20 PROCEDURE — 76536 US EXAM OF HEAD AND NECK: CPT

## 2021-10-25 DIAGNOSIS — E78.5 HYPERLIPIDEMIA, UNSPECIFIED HYPERLIPIDEMIA TYPE: ICD-10-CM

## 2021-10-25 DIAGNOSIS — I10 ESSENTIAL HYPERTENSION: ICD-10-CM

## 2021-10-25 DIAGNOSIS — E78.49 OTHER HYPERLIPIDEMIA: ICD-10-CM

## 2021-10-25 RX ORDER — ATORVASTATIN CALCIUM 20 MG/1
20 TABLET, FILM COATED ORAL DAILY
Qty: 90 TABLET | Refills: 0 | Status: CANCELLED | OUTPATIENT
Start: 2021-10-25

## 2021-10-25 NOTE — TELEPHONE ENCOUNTER
Last Visit: 4/12/21 MD Ronaldo Rowland, labs 10/2020  Next Appointment: Not scheduled- Needs new provider/appt/labs  Previous Refill Encounter(s): HCTZ 10/28/20 90 + 3  Atorvastatin 10/28/21 90 + 3  Asa 81mg  10/6/21 90  3      Requested Prescriptions     Pending Prescriptions Disp Refills    hydroCHLOROthiazide (HYDRODIURIL) 25 mg tablet 90 Tablet 0     Sig: Take 1 Tablet by mouth daily. Apppointment/labs due with new provider!  atorvastatin (LIPITOR) 20 mg tablet 90 Tablet 0     Sig: Take 1 Tablet by mouth daily.  aspirin 81 mg chewable tablet 90 Tablet 0     Sig: Take 1 Tablet by mouth daily.

## 2021-10-27 ENCOUNTER — TELEPHONE (OUTPATIENT)
Dept: FAMILY MEDICINE CLINIC | Age: 66
End: 2021-10-27

## 2021-10-27 NOTE — TELEPHONE ENCOUNTER
Patient call today stating she needs her medications. Refills requested already pending to front office as patient of MD Kathy Lynch. Patient states she needs refills. Rx's pended from 10/25/21.     Thanks, Sofía Whitlock

## 2021-10-28 DIAGNOSIS — E78.5 HYPERLIPIDEMIA, UNSPECIFIED HYPERLIPIDEMIA TYPE: ICD-10-CM

## 2021-10-28 DIAGNOSIS — I10 ESSENTIAL HYPERTENSION: ICD-10-CM

## 2021-10-28 DIAGNOSIS — E78.49 OTHER HYPERLIPIDEMIA: ICD-10-CM

## 2021-10-28 RX ORDER — HYDROCHLOROTHIAZIDE 25 MG/1
25 TABLET ORAL DAILY
Qty: 90 TABLET | Refills: 0 | Status: SHIPPED | OUTPATIENT
Start: 2021-10-28 | End: 2021-11-10 | Stop reason: SDUPTHER

## 2021-10-28 RX ORDER — GUAIFENESIN 100 MG/5ML
81 LIQUID (ML) ORAL DAILY
Qty: 90 TABLET | Refills: 0 | Status: SHIPPED | OUTPATIENT
Start: 2021-10-28 | End: 2021-11-10 | Stop reason: SDUPTHER

## 2021-10-28 RX ORDER — ATORVASTATIN CALCIUM 20 MG/1
20 TABLET, FILM COATED ORAL DAILY
Qty: 90 TABLET | Refills: 0 | Status: SHIPPED | OUTPATIENT
Start: 2021-10-28 | End: 2021-11-10 | Stop reason: SDUPTHER

## 2021-10-28 NOTE — TELEPHONE ENCOUNTER
Patient has appointment on 11/10/2021 with NP Joe Hernandez need refill out completely would like enough till she comes in to see provider    . Requested Prescriptions     Pending Prescriptions Disp Refills    hydroCHLOROthiazide (HYDRODIURIL) 25 mg tablet 90 Tablet 3     Sig: Take 1 Tablet by mouth daily.  atorvastatin (LIPITOR) 20 mg tablet 90 Tablet 3     Sig: Take 1 Tablet by mouth daily.  aspirin 81 mg chewable tablet 90 Tablet 3     Sig: Take 1 Tablet by mouth daily.      Bet call back

## 2021-10-28 NOTE — TELEPHONE ENCOUNTER
Patient has appt scheduled. Patient is out of meds. Thanks, Carmen    Last Visit: 4/12/21 MD Maggy Marcial  Next Appointment: 11/10/21 IZZY Bhakta  Previous Refill Encounter(s):   hctz 10/28/20 90 + 3  Atorvastatin 10/28/20 90 + 3  Asa 81mg 10/6/20 90 + 3     Requested Prescriptions     Pending Prescriptions Disp Refills    hydroCHLOROthiazide (HYDRODIURIL) 25 mg tablet 90 Tablet 0     Sig: Take 1 Tablet by mouth daily.  atorvastatin (LIPITOR) 20 mg tablet 90 Tablet 0     Sig: Take 1 Tablet by mouth daily.  aspirin 81 mg chewable tablet 90 Tablet 0     Sig: Take 1 Tablet by mouth daily.

## 2021-11-03 RX ORDER — GUAIFENESIN 100 MG/5ML
81 LIQUID (ML) ORAL DAILY
Qty: 90 TABLET | Refills: 0 | OUTPATIENT
Start: 2021-11-03

## 2021-11-03 RX ORDER — HYDROCHLOROTHIAZIDE 25 MG/1
25 TABLET ORAL DAILY
Qty: 90 TABLET | Refills: 0 | OUTPATIENT
Start: 2021-11-03

## 2021-11-10 ENCOUNTER — OFFICE VISIT (OUTPATIENT)
Dept: FAMILY MEDICINE CLINIC | Age: 66
End: 2021-11-10
Payer: COMMERCIAL

## 2021-11-10 VITALS
WEIGHT: 213.6 LBS | TEMPERATURE: 97.7 F | DIASTOLIC BLOOD PRESSURE: 69 MMHG | HEIGHT: 59 IN | RESPIRATION RATE: 16 BRPM | OXYGEN SATURATION: 98 % | SYSTOLIC BLOOD PRESSURE: 109 MMHG | HEART RATE: 65 BPM | BODY MASS INDEX: 43.06 KG/M2

## 2021-11-10 DIAGNOSIS — E66.01 OBESITY, MORBID (HCC): Primary | ICD-10-CM

## 2021-11-10 DIAGNOSIS — E78.49 OTHER HYPERLIPIDEMIA: ICD-10-CM

## 2021-11-10 DIAGNOSIS — E04.2 MULTINODULAR GOITER: ICD-10-CM

## 2021-11-10 DIAGNOSIS — I10 ESSENTIAL HYPERTENSION: ICD-10-CM

## 2021-11-10 DIAGNOSIS — E78.5 HYPERLIPIDEMIA, UNSPECIFIED HYPERLIPIDEMIA TYPE: ICD-10-CM

## 2021-11-10 PROCEDURE — 99212 OFFICE O/P EST SF 10 MIN: CPT | Performed by: NURSE PRACTITIONER

## 2021-11-10 RX ORDER — HYDROCHLOROTHIAZIDE 25 MG/1
25 TABLET ORAL DAILY
Qty: 90 TABLET | Refills: 2 | Status: SHIPPED | OUTPATIENT
Start: 2021-11-10 | End: 2022-09-20

## 2021-11-10 RX ORDER — ATORVASTATIN CALCIUM 20 MG/1
20 TABLET, FILM COATED ORAL DAILY
Qty: 90 TABLET | Refills: 2 | Status: SHIPPED | OUTPATIENT
Start: 2021-11-10 | End: 2022-09-20

## 2021-11-10 RX ORDER — GUAIFENESIN 100 MG/5ML
81 LIQUID (ML) ORAL DAILY
Qty: 90 TABLET | Refills: 2 | Status: SHIPPED | OUTPATIENT
Start: 2021-11-10 | End: 2022-09-20

## 2021-11-10 NOTE — PATIENT INSTRUCTIONS
High Blood Pressure: Care Instructions  Overview     It's normal for blood pressure to go up and down throughout the day. But if it stays up, you have high blood pressure. Another name for high blood pressure is hypertension. Despite what a lot of people think, high blood pressure usually doesn't cause headaches or make you feel dizzy or lightheaded. It usually has no symptoms. But it does increase your risk of stroke, heart attack, and other problems. You and your doctor will talk about your risks of these problems based on your blood pressure. Your doctor will give you a goal for your blood pressure. Your goal will be based on your health and your age. Lifestyle changes, such as eating healthy and being active, are always important to help lower blood pressure. You might also take medicine to reach your blood pressure goal.  Follow-up care is a key part of your treatment and safety. Be sure to make and go to all appointments, and call your doctor if you are having problems. It's also a good idea to know your test results and keep a list of the medicines you take. How can you care for yourself at home? Medical treatment  · If you stop taking your medicine, your blood pressure will go back up. You may take one or more types of medicine to lower your blood pressure. Be safe with medicines. Take your medicine exactly as prescribed. Call your doctor if you think you are having a problem with your medicine. · Talk to your doctor before you start taking aspirin every day. Aspirin can help certain people lower their risk of a heart attack or stroke. But taking aspirin isn't right for everyone, because it can cause serious bleeding. · See your doctor regularly. You may need to see the doctor more often at first or until your blood pressure comes down. · If you are taking blood pressure medicine, talk to your doctor before you take decongestants or anti-inflammatory medicine, such as ibuprofen.  Some of these medicines can raise blood pressure. · Learn how to check your blood pressure at home. Lifestyle changes  · Stay at a healthy weight. This is especially important if you put on weight around the waist. Losing even 10 pounds can help you lower your blood pressure. · If your doctor recommends it, get more exercise. Walking is a good choice. Bit by bit, increase the amount you walk every day. Try for at least 30 minutes on most days of the week. You also may want to swim, bike, or do other activities. · Avoid or limit alcohol. Talk to your doctor about whether you can drink any alcohol. · Try to limit how much sodium you eat to less than 2,300 milligrams (mg) a day. Your doctor may ask you to try to eat less than 1,500 mg a day. · Eat plenty of fruits (such as bananas and oranges), vegetables, legumes, whole grains, and low-fat dairy products. · Lower the amount of saturated fat in your diet. Saturated fat is found in animal products such as milk, cheese, and meat. Limiting these foods may help you lose weight and also lower your risk for heart disease. · Do not smoke. Smoking increases your risk for heart attack and stroke. If you need help quitting, talk to your doctor about stop-smoking programs and medicines. These can increase your chances of quitting for good. When should you call for help? Call 911  anytime you think you may need emergency care. This may mean having symptoms that suggest that your blood pressure is causing a serious heart or blood vessel problem. Your blood pressure may be over 180/120. For example, call 911 if:    · You have symptoms of a heart attack. These may include:  ? Chest pain or pressure, or a strange feeling in the chest.  ? Sweating. ? Shortness of breath. ? Nausea or vomiting. ? Pain, pressure, or a strange feeling in the back, neck, jaw, or upper belly or in one or both shoulders or arms. ? Lightheadedness or sudden weakness.   ? A fast or irregular heartbeat.     · You have symptoms of a stroke. These may include:  ? Sudden numbness, tingling, weakness, or loss of movement in your face, arm, or leg, especially on only one side of your body. ? Sudden vision changes. ? Sudden trouble speaking. ? Sudden confusion or trouble understanding simple statements. ? Sudden problems with walking or balance. ? A sudden, severe headache that is different from past headaches.     · You have severe back or belly pain. Do not wait until your blood pressure comes down on its own. Get help right away. Call your doctor now or seek immediate care if:    · Your blood pressure is much higher than normal (such as 180/120 or higher), but you don't have symptoms.     · You think high blood pressure is causing symptoms, such as:  ? Severe headache.  ? Blurry vision. Watch closely for changes in your health, and be sure to contact your doctor if:    · Your blood pressure measures higher than your doctor recommends at least 2 times. That means the top number is higher or the bottom number is higher, or both.     · You think you may be having side effects from your blood pressure medicine. Where can you learn more? Go to http://www.gray.com/  Enter [id number] in the search box to learn more about \"High Blood Pressure: Care Instructions. \"  Current as of: April 29, 2021               Content Version: 13.0  © 2006-2021 [employer]. Care instructions adapted under license by [employer] (which disclaims liability or warranty for this information). If you have questions about a medical condition or this instruction, always ask your healthcare professional. [place] [address] any warranty or liability for your use of this information.

## 2021-11-10 NOTE — PROGRESS NOTES
Chief Complaint   Patient presents with    Establish Care    Hypertension         1. \"Have you been to the ER, urgent care clinic since your last visit? Hospitalized since your last visit? \" No    2. \"Have you seen or consulted any other health care providers outside of the [address] [place] [address] since your last visit? \" Yes When: 9/27/21 Where: OBGYN Reason for visit: checkup     3. For patients over 45: Has the patient had a colonoscopy? Yes, HM satisfied with blue hyperlink     If the patient is female:    4. For patients over 40: Has the patient had a mammogram? Yes, HM satisfied with blue hyperlink    5. For patients over 21: Has the patient had a pap smear? Yes, HM satisfied with blue hyperlink   Have you had the covid vaccine. .. when    3 most recent PHQ Screens 11/10/2021   Little interest or pleasure in doing things Not at all   Feeling down, depressed, irritable, or hopeless Not at all   Total Score PHQ 2 0       Health Maintenance Due   Topic Date Due    Lipid Screen  10/28/2021

## 2021-11-11 ENCOUNTER — LAB ONLY (OUTPATIENT)
Dept: FAMILY MEDICINE CLINIC | Age: 66
End: 2021-11-11

## 2021-11-11 DIAGNOSIS — E66.01 OBESITY, MORBID (HCC): ICD-10-CM

## 2021-11-11 DIAGNOSIS — E78.49 OTHER HYPERLIPIDEMIA: ICD-10-CM

## 2021-11-11 DIAGNOSIS — E04.2 MULTINODULAR GOITER: ICD-10-CM

## 2021-11-11 DIAGNOSIS — I10 ESSENTIAL HYPERTENSION: ICD-10-CM

## 2021-11-11 LAB
ALBUMIN SERPL-MCNC: 3.4 G/DL (ref 3.5–5)
ALBUMIN/GLOB SERPL: 0.9 {RATIO} (ref 1.1–2.2)
ALP SERPL-CCNC: 122 U/L (ref 45–117)
ALT SERPL-CCNC: 23 U/L (ref 12–78)
ANION GAP SERPL CALC-SCNC: 2 MMOL/L (ref 5–15)
AST SERPL-CCNC: 18 U/L (ref 15–37)
BILIRUB SERPL-MCNC: 0.8 MG/DL (ref 0.2–1)
BUN SERPL-MCNC: 20 MG/DL (ref 6–20)
BUN/CREAT SERPL: 31 (ref 12–20)
CALCIUM SERPL-MCNC: 9.6 MG/DL (ref 8.5–10.1)
CHLORIDE SERPL-SCNC: 106 MMOL/L (ref 97–108)
CHOLEST SERPL-MCNC: 154 MG/DL
CO2 SERPL-SCNC: 30 MMOL/L (ref 21–32)
CREAT SERPL-MCNC: 0.65 MG/DL (ref 0.55–1.02)
GLOBULIN SER CALC-MCNC: 4 G/DL (ref 2–4)
GLUCOSE SERPL-MCNC: 92 MG/DL (ref 65–100)
HDLC SERPL-MCNC: 75 MG/DL
HDLC SERPL: 2.1 {RATIO} (ref 0–5)
LDLC SERPL CALC-MCNC: 65.6 MG/DL (ref 0–100)
POTASSIUM SERPL-SCNC: 4.6 MMOL/L (ref 3.5–5.1)
PROT SERPL-MCNC: 7.4 G/DL (ref 6.4–8.2)
SODIUM SERPL-SCNC: 138 MMOL/L (ref 136–145)
T4 SERPL-MCNC: 8.6 UG/DL (ref 4.8–13.9)
TRIGL SERPL-MCNC: 67 MG/DL (ref ?–150)
TSH SERPL DL<=0.05 MIU/L-ACNC: 1.21 UIU/ML (ref 0.36–3.74)
VLDLC SERPL CALC-MCNC: 13.4 MG/DL

## 2021-12-17 ENCOUNTER — TELEPHONE (OUTPATIENT)
Dept: FAMILY MEDICINE CLINIC | Age: 66
End: 2021-12-17

## 2021-12-17 NOTE — TELEPHONE ENCOUNTER
Enrique Webb (Self) [phone] (M)     Pt needs Thuy to call her back in regards to sending a fax to the billing department. Please call back and advise.

## 2021-12-28 ENCOUNTER — TELEPHONE (OUTPATIENT)
Dept: FAMILY MEDICINE CLINIC | Age: 66
End: 2021-12-28

## 2021-12-28 NOTE — TELEPHONE ENCOUNTER
----- Message from Juliana Diaz sent at 12/28/2021  8:53 AM EST -----  Subject: Message to Provider    QUESTIONS  Information for Provider? Please have Lili Michaels, the supervisor return my call,   she left a message for patient to call her back. ---------------------------------------------------------------------------  --------------  Carmen MCLAUGHLIN  What is the best way for the office to contact you? OK to leave message on   voicemail  Preferred Call Back Phone Number? [phone]  ---------------------------------------------------------------------------  --------------  SCRIPT ANSWERS  Relationship to Patient?  Self

## 2022-01-28 ENCOUNTER — TRANSCRIBE ORDER (OUTPATIENT)
Dept: MAMMOGRAPHY | Age: 67
End: 2022-01-28

## 2022-01-28 ENCOUNTER — HOSPITAL ENCOUNTER (OUTPATIENT)
Dept: MAMMOGRAPHY | Age: 67
Discharge: HOME | End: 2022-01-28
Payer: COMMERCIAL

## 2022-01-28 DIAGNOSIS — Z12.31 VISIT FOR SCREENING MAMMOGRAM: ICD-10-CM

## 2022-01-28 DIAGNOSIS — Z12.31 VISIT FOR SCREENING MAMMOGRAM: Primary | ICD-10-CM

## 2022-01-28 PROCEDURE — 77063 BREAST TOMOSYNTHESIS BI: CPT

## 2022-03-18 PROBLEM — E04.2 MULTINODULAR GOITER: Status: ACTIVE | Noted: 2018-11-06

## 2022-03-18 PROBLEM — M17.0 PRIMARY OSTEOARTHRITIS OF BOTH KNEES: Status: ACTIVE | Noted: 2018-10-24

## 2022-03-18 PROBLEM — E66.01 OBESITY, MORBID (HCC): Status: ACTIVE | Noted: 2018-10-24

## 2022-03-18 PROBLEM — K29.30 CHRONIC SUPERFICIAL GASTRITIS WITHOUT BLEEDING: Status: ACTIVE | Noted: 2018-11-06

## 2022-03-19 PROBLEM — I10 ESSENTIAL HYPERTENSION: Status: ACTIVE | Noted: 2019-02-06

## 2022-03-19 PROBLEM — E78.49 OTHER HYPERLIPIDEMIA: Status: ACTIVE | Noted: 2019-04-30

## 2022-03-19 PROBLEM — D25.1 INTRAMURAL LEIOMYOMA OF UTERUS: Status: ACTIVE | Noted: 2018-11-07

## 2022-10-16 DIAGNOSIS — I10 ESSENTIAL HYPERTENSION: ICD-10-CM

## 2022-10-16 DIAGNOSIS — E78.49 OTHER HYPERLIPIDEMIA: ICD-10-CM

## 2022-10-16 DIAGNOSIS — E78.5 HYPERLIPIDEMIA, UNSPECIFIED HYPERLIPIDEMIA TYPE: ICD-10-CM

## 2022-10-18 RX ORDER — GUAIFENESIN 100 MG/5ML
LIQUID (ML) ORAL
Qty: 30 TABLET | Refills: 0 | Status: SHIPPED | OUTPATIENT
Start: 2022-10-18 | End: 2022-10-27 | Stop reason: SDUPTHER

## 2022-10-18 RX ORDER — ATORVASTATIN CALCIUM 20 MG/1
TABLET, FILM COATED ORAL
Qty: 30 TABLET | Refills: 0 | Status: SHIPPED | OUTPATIENT
Start: 2022-10-18 | End: 2022-10-27 | Stop reason: SDUPTHER

## 2022-10-18 RX ORDER — HYDROCHLOROTHIAZIDE 25 MG/1
TABLET ORAL
Qty: 30 TABLET | Refills: 0 | Status: SHIPPED | OUTPATIENT
Start: 2022-10-18 | End: 2022-10-27 | Stop reason: SDUPTHER

## 2022-10-27 ENCOUNTER — TELEPHONE (OUTPATIENT)
Dept: FAMILY MEDICINE CLINIC | Age: 67
End: 2022-10-27

## 2022-10-27 ENCOUNTER — OFFICE VISIT (OUTPATIENT)
Dept: FAMILY MEDICINE CLINIC | Age: 67
End: 2022-10-27
Payer: COMMERCIAL

## 2022-10-27 VITALS
BODY MASS INDEX: 43.75 KG/M2 | HEIGHT: 59 IN | DIASTOLIC BLOOD PRESSURE: 66 MMHG | OXYGEN SATURATION: 99 % | WEIGHT: 217 LBS | RESPIRATION RATE: 17 BRPM | SYSTOLIC BLOOD PRESSURE: 133 MMHG | TEMPERATURE: 98.4 F | HEART RATE: 62 BPM

## 2022-10-27 DIAGNOSIS — R41.3 MEMORY IMPAIRMENT: ICD-10-CM

## 2022-10-27 DIAGNOSIS — E66.01 OBESITY, MORBID (HCC): ICD-10-CM

## 2022-10-27 DIAGNOSIS — I10 ESSENTIAL HYPERTENSION: ICD-10-CM

## 2022-10-27 DIAGNOSIS — Z00.00 HEALTHCARE MAINTENANCE: ICD-10-CM

## 2022-10-27 DIAGNOSIS — E04.2 MULTINODULAR GOITER: ICD-10-CM

## 2022-10-27 DIAGNOSIS — E78.49 OTHER HYPERLIPIDEMIA: ICD-10-CM

## 2022-10-27 DIAGNOSIS — Z00.00 WELLNESS EXAMINATION: Primary | ICD-10-CM

## 2022-10-27 LAB
ALBUMIN SERPL-MCNC: 3.5 G/DL (ref 3.5–5)
ALBUMIN/GLOB SERPL: 0.9 {RATIO} (ref 1.1–2.2)
ALP SERPL-CCNC: 128 U/L (ref 45–117)
ALT SERPL-CCNC: 22 U/L (ref 12–78)
ANION GAP SERPL CALC-SCNC: 5 MMOL/L (ref 5–15)
AST SERPL-CCNC: 18 U/L (ref 15–37)
BASOPHILS # BLD: 0 K/UL (ref 0–0.1)
BASOPHILS NFR BLD: 1 % (ref 0–1)
BILIRUB SERPL-MCNC: 0.8 MG/DL (ref 0.2–1)
BUN SERPL-MCNC: 18 MG/DL (ref 6–20)
BUN/CREAT SERPL: 27 (ref 12–20)
CALCIUM SERPL-MCNC: 9.2 MG/DL (ref 8.5–10.1)
CHLORIDE SERPL-SCNC: 104 MMOL/L (ref 97–108)
CHOLEST SERPL-MCNC: 157 MG/DL
CO2 SERPL-SCNC: 31 MMOL/L (ref 21–32)
COMMENT, HOLDF: NORMAL
CREAT SERPL-MCNC: 0.66 MG/DL (ref 0.55–1.02)
DIFFERENTIAL METHOD BLD: ABNORMAL
EOSINOPHIL # BLD: 0.1 K/UL (ref 0–0.4)
EOSINOPHIL NFR BLD: 2 % (ref 0–7)
ERYTHROCYTE [DISTWIDTH] IN BLOOD BY AUTOMATED COUNT: 13.6 % (ref 11.5–14.5)
GLOBULIN SER CALC-MCNC: 3.8 G/DL (ref 2–4)
GLUCOSE SERPL-MCNC: 80 MG/DL (ref 65–100)
HCT VFR BLD AUTO: 36.5 % (ref 35–47)
HDLC SERPL-MCNC: 81 MG/DL
HDLC SERPL: 1.9 {RATIO} (ref 0–5)
HGB BLD-MCNC: 13.1 G/DL (ref 11.5–16)
IMM GRANULOCYTES # BLD AUTO: 0 K/UL (ref 0–0.04)
IMM GRANULOCYTES NFR BLD AUTO: 0 % (ref 0–0.5)
LDLC SERPL CALC-MCNC: 65 MG/DL (ref 0–100)
LYMPHOCYTES # BLD: 1.7 K/UL (ref 0.8–3.5)
LYMPHOCYTES NFR BLD: 42 % (ref 12–49)
MCH RBC QN AUTO: 36.9 PG (ref 26–34)
MCHC RBC AUTO-ENTMCNC: 35.9 G/DL (ref 30–36.5)
MCV RBC AUTO: 102.8 FL (ref 80–99)
MONOCYTES # BLD: 0.3 K/UL (ref 0–1)
MONOCYTES NFR BLD: 7 % (ref 5–13)
NEUTS SEG # BLD: 1.9 K/UL (ref 1.8–8)
NEUTS SEG NFR BLD: 48 % (ref 32–75)
NRBC # BLD: 0 K/UL (ref 0–0.01)
NRBC BLD-RTO: 0 PER 100 WBC
PLATELET # BLD AUTO: 305 K/UL (ref 150–400)
PMV BLD AUTO: 11.7 FL (ref 8.9–12.9)
POTASSIUM SERPL-SCNC: 4.5 MMOL/L (ref 3.5–5.1)
PROT SERPL-MCNC: 7.3 G/DL (ref 6.4–8.2)
RBC # BLD AUTO: 3.55 M/UL (ref 3.8–5.2)
SAMPLES BEING HELD,HOLD: NORMAL
SODIUM SERPL-SCNC: 140 MMOL/L (ref 136–145)
T4 FREE SERPL-MCNC: 1 NG/DL (ref 0.8–1.5)
TRIGL SERPL-MCNC: 55 MG/DL (ref ?–150)
TSH SERPL DL<=0.05 MIU/L-ACNC: 1.42 UIU/ML (ref 0.36–3.74)
VLDLC SERPL CALC-MCNC: 11 MG/DL
WBC # BLD AUTO: 3.9 K/UL (ref 3.6–11)

## 2022-10-27 PROCEDURE — 99214 OFFICE O/P EST MOD 30 MIN: CPT | Performed by: NURSE PRACTITIONER

## 2022-10-27 PROCEDURE — 3078F DIAST BP <80 MM HG: CPT | Performed by: NURSE PRACTITIONER

## 2022-10-27 PROCEDURE — 3074F SYST BP LT 130 MM HG: CPT | Performed by: NURSE PRACTITIONER

## 2022-10-27 PROCEDURE — 99397 PER PM REEVAL EST PAT 65+ YR: CPT | Performed by: NURSE PRACTITIONER

## 2022-10-27 RX ORDER — GUAIFENESIN 100 MG/5ML
81 LIQUID (ML) ORAL DAILY
Qty: 90 TABLET | Refills: 3 | Status: SHIPPED | OUTPATIENT
Start: 2022-10-27

## 2022-10-27 RX ORDER — HYDROCHLOROTHIAZIDE 25 MG/1
25 TABLET ORAL DAILY
Qty: 90 TABLET | Refills: 3 | Status: SHIPPED | OUTPATIENT
Start: 2022-10-27

## 2022-10-27 RX ORDER — ATORVASTATIN CALCIUM 20 MG/1
20 TABLET, FILM COATED ORAL DAILY
Qty: 90 TABLET | Refills: 3 | Status: SHIPPED | OUTPATIENT
Start: 2022-10-27

## 2022-10-27 NOTE — TELEPHONE ENCOUNTER
Faxed and confirmed. ----- Message from Myles Heath NP sent at 10/27/2022 12:23 PM EDT -----  Regarding: records  Please request DEXA / bone density test and Ultrasounds done in the last few years.  The patient has them done at  [address] [place] [address]

## 2022-10-27 NOTE — PROGRESS NOTES
No chief complaint on file. 1. \"Have you been to the ER, urgent care clinic since your last visit? Hospitalized since your last visit? \" No    2. \"Have you seen or consulted any other health care providers outside of the [address] [place] [address] since your last visit? \" No     3. For patients aged 39-70: Has the patient had a colonoscopy / FIT/ Cologuard? Yes - no Care Gap present      If the patient is female:    4. For patients aged 41-77: Has the patient had a mammogram within the past 2 years? Yes - no Care Gap present      5. For patients aged 21-65: Has the patient had a pap smear? Yes - no Care Gap present         3 most recent PHQ Screens 10/27/2022   Little interest or pleasure in doing things Not at all   Feeling down, depressed, irritable, or hopeless Not at all   Total Score PHQ 2 0       Health Maintenance Due   Topic Date Due    Flu Vaccine (1) 08/01/2022    Bone Densitometry (Dexa) Screening  09/21/2022    Pneumococcal 65+ years (1 - PCV) Never done    Depression Screen  11/10/2022        Jordana Perez is a 74 y.o. female  who presents for routine immunizations. Influenza vaccine High Dose  She denies any symptoms , reactions or allergies that would exclude them from being immunized today. Risks and adverse reactions were discussed and the VIS was given to them. All questions were addressed. She was observed for 10 min post injection. There were no reactions observed.

## 2022-10-27 NOTE — PROGRESS NOTES
[facility] Note     Neri Biswas (: 1955) is a 74 y.o. female, established patient, here for evaluation of the following chief complaint(s):  Medication Evaluation       ASSESSMENT/PLAN:    Diagnoses and all orders for this visit:    1. Wellness examination  - Normal exam      - METABOLIC PANEL, COMPREHENSIVE; Future  -     CBC WITH AUTOMATED DIFF; Future  -     LIPID PANEL; Future  -     T4, FREE; Future  -     TSH 3RD GENERATION; Future    2. Obesity, morbid ([address]  -     Diet and lifestyle modification encouraged for weight loss and chronic disease prevention/ management    Wt Readings from Last 3 Encounters:   10/27/22 217 lb (98.4 kg)   11/10/21 213 lb 9.6 oz (96.9 kg)   21 218 lb 9.6 oz (99.2 kg)     3. Multinodular goiter  -     T4, FREE; Future  -     TSH 3RD GENERATION; Future    4. Essential hypertension  -     METABOLIC PANEL, COMPREHENSIVE; Future  -     LIPID PANEL; Future  -     hydroCHLOROthiazide (HYDRODIURIL) 25 mg tablet; Take 1 Tablet by mouth daily. 5. Other hyperlipidemia  -     METABOLIC PANEL, COMPREHENSIVE; Future  -     LIPID PANEL; Future  -     atorvastatin (LIPITOR) 20 mg tablet; Take 1 Tablet by mouth daily. -     aspirin 81 mg chewable tablet; Take 1 Tablet by mouth daily. 6. Memory impairment  -     REFERRAL TO NEUROPSYCHOLOGY, patient wishes to pursue testing  - Mini-Cog 4/5    7. Healthcare maintenance  -     pneumococcal 20-hunter conj-dip, PF, (PREVNAR 20) 0.5 mL syrg injection; 0.5 mL by IntraMUSCular route once for 1 dose. - Will request DEXA scan results from [address] [place] [address] in [address] [place] [address]      Return in about 1 year (around 10/27/2023), or if symptoms worsen or fail to improve. SUBJECTIVE/OBJECTIVE:    Neri Biswas is a 74 y.o. female seen today for wellness exam.     Cardiovascular Review:  She has hypertension, hyperlipidemia and obesity.   Diet and Lifestyle: generally follows a low fat low cholesterol diet, exercises regularly, nonsmoker walks 30-45 min most days  Home BP Monitoring: is well controlled at home, ranging 120's/70's. Pertinent ROS: taking medications as instructed, no medication side effects noted, no TIA's, no chest pain on exertion, no dyspnea on exertion, no swelling of ankles. Tobacco: No  ETOH:No  Cervical Cancer Screening: up to date. Colon Cancer Screening: up to date. Breast Cancer Screening: up to date  DEXA: will request records  Hep C: up to date         Review of Systems   Constitutional: Negative. HENT: Negative. Eyes: Negative. Respiratory: Negative. Cardiovascular: Negative. Genitourinary: Negative. Musculoskeletal: Negative. Skin: Negative. Neurological: Negative. Psychiatric/Behavioral: Negative. Wt Readings from Last 3 Encounters:   10/27/22 217 lb (98.4 kg)   11/10/21 213 lb 9.6 oz (96.9 kg)   04/12/21 218 lb 9.6 oz (99.2 kg)     Temp Readings from Last 3 Encounters:   10/27/22 98.4 °F (36.9 °C) (Temporal)   11/10/21 97.7 °F (36.5 °C) (Temporal)   04/12/21 97.3 °F (36.3 °C) (Temporal)     BP Readings from Last 3 Encounters:   10/27/22 133/66   11/10/21 109/69   04/12/21 118/76     Pulse Readings from Last 3 Encounters:   10/27/22 62   11/10/21 65   04/12/21 (!) 57         General appearance - Alert, NAD. AAF  Head: Atraumatic. Normocephalic. Eyes:  Sclera anicteric. Ears: Hearing grossly normal.  Bilat EAC clear  Respiratory - LCTAB. No wheeze/rale/rhonchi  Heart - Normal rate, regular rhythm. No M/R/G  Abdomen - Non distended. Neurological - No focal deficits. Speech normal.   Musculoskeletal - Normal ROM, Gait normal.   Extremities - No LE edema. Distal pulses intact  Skin - normal coloration and normal turgor. No cyanosis, no rash. Treatment risks/benefits/costs/interactions/alternatives discussed with patient.   Advised patient to call back or return to office if symptoms worsen/change/persist. If patient cannot reach us or should anything more severe/urgent arise he/she should proceed directly to the nearest emergency department. Discussed expected course/resolution/complications of diagnosis in detail with patient. Patient expressed understanding with the diagnosis and plan. An electronic signature was used to authenticate this note.   -- Carolyn Cutler NP

## 2023-01-11 ENCOUNTER — OFFICE VISIT (OUTPATIENT)
Dept: FAMILY MEDICINE CLINIC | Age: 68
End: 2023-01-11
Payer: MEDICARE

## 2023-01-11 VITALS
HEART RATE: 72 BPM | BODY MASS INDEX: 45.52 KG/M2 | DIASTOLIC BLOOD PRESSURE: 83 MMHG | RESPIRATION RATE: 16 BRPM | HEIGHT: 59 IN | SYSTOLIC BLOOD PRESSURE: 128 MMHG | OXYGEN SATURATION: 100 % | TEMPERATURE: 98 F | WEIGHT: 225.8 LBS

## 2023-01-11 DIAGNOSIS — E66.01 MORBID OBESITY WITH BMI OF 45.0-49.9, ADULT (HCC): ICD-10-CM

## 2023-01-11 DIAGNOSIS — R10.84 GENERALIZED ABDOMINAL PAIN: ICD-10-CM

## 2023-01-11 DIAGNOSIS — R19.7 DIARRHEA, UNSPECIFIED TYPE: Primary | ICD-10-CM

## 2023-01-11 PROCEDURE — 99213 OFFICE O/P EST LOW 20 MIN: CPT | Performed by: NURSE PRACTITIONER

## 2023-01-11 PROCEDURE — 3074F SYST BP LT 130 MM HG: CPT | Performed by: NURSE PRACTITIONER

## 2023-01-11 PROCEDURE — G8417 CALC BMI ABV UP PARAM F/U: HCPCS | Performed by: NURSE PRACTITIONER

## 2023-01-11 PROCEDURE — 1101F PT FALLS ASSESS-DOCD LE1/YR: CPT | Performed by: NURSE PRACTITIONER

## 2023-01-11 PROCEDURE — G8427 DOCREV CUR MEDS BY ELIG CLIN: HCPCS | Performed by: NURSE PRACTITIONER

## 2023-01-11 PROCEDURE — G8399 PT W/DXA RESULTS DOCUMENT: HCPCS | Performed by: NURSE PRACTITIONER

## 2023-01-11 PROCEDURE — 3017F COLORECTAL CA SCREEN DOC REV: CPT | Performed by: NURSE PRACTITIONER

## 2023-01-11 PROCEDURE — 3079F DIAST BP 80-89 MM HG: CPT | Performed by: NURSE PRACTITIONER

## 2023-01-11 PROCEDURE — 1090F PRES/ABSN URINE INCON ASSESS: CPT | Performed by: NURSE PRACTITIONER

## 2023-01-11 PROCEDURE — 1123F ACP DISCUSS/DSCN MKR DOCD: CPT | Performed by: NURSE PRACTITIONER

## 2023-01-11 PROCEDURE — G8536 NO DOC ELDER MAL SCRN: HCPCS | Performed by: NURSE PRACTITIONER

## 2023-01-11 PROCEDURE — G9899 SCRN MAM PERF RSLTS DOC: HCPCS | Performed by: NURSE PRACTITIONER

## 2023-01-11 PROCEDURE — G8432 DEP SCR NOT DOC, RNG: HCPCS | Performed by: NURSE PRACTITIONER

## 2023-01-11 PROCEDURE — G0463 HOSPITAL OUTPT CLINIC VISIT: HCPCS | Performed by: NURSE PRACTITIONER

## 2023-01-11 RX ORDER — ACETAMINOPHEN 500 MG
1 TABLET ORAL DAILY
Qty: 30 CAPSULE | Refills: 1 | Status: SHIPPED | OUTPATIENT
Start: 2023-01-11

## 2023-01-11 NOTE — PROGRESS NOTES
[facility] Note     Danni Goldsmith (: 1955) is a 74 y.o. female, established patient, here for evaluation of the following chief complaint(s):  Abdominal Pain (Constant since having diarrhea Monday-Tuesday)       ASSESSMENT/PLAN:  1. Diarrhea, unspecified type  -     CBC WITH AUTOMATED DIFF; Future  -     METABOLIC PANEL, COMPREHENSIVE; Future  -     AMYLASE; Future  -     LIPASE; Future  -     C. DIFFICILE AG & TOXIN A/B; Future  -     ENTERIC BACTERIA PANEL, DNA; Future  -     OVA & PARASITES, STOOL; Future  -Commended bland/BRAT diet  -Add probiotic 1 capsule daily  -Counseling provided as to when to seek emergent care    2. Morbid obesity with BMI of 45.0-49.9, adult (HCC)  -    Diet and lifestyle modification encouraged for weight loss and chronic disease prevention/ management    3. Generalized abdominal pain  -     plan per #1 above. Exam normal.  No acute abdomen. Defer imaging at this time. Return in about 1 month (around 2023), or if symptoms worsen or fail to improve, for welcome to medicare visit. SUBJECTIVE/OBJECTIVE:    Danni Goldsmith is a 74 y.o. female seen today for mental discomfort with diarrhea. Ms. Samuel Mike reports she began having diarrhea described as watery stool somewhere between  or . She reports having abdominal discomfort more as a gnawing or cramping sensation. Her stools have slightly improved. She describes that she has been eating small amounts of soft foods. When she eats about an hour later she will have a BM. She describes her subsequent bowel movements as soft, small amounts and \"squirting\". Denies recent travel. Denies fever. Denies nausea or vomiting. Denies blood in the stool. No known sick contacts. REVIEW OF SYSTEMS:    Review of Systems   Gastrointestinal:  Positive for diarrhea. All other systems reviewed and are negative.       VITAL SIGNS:    Wt Readings from Last 3 Encounters:   01/11/23 225 lb 12.8 oz (102.4 kg)   10/27/22 217 lb (98.4 kg)   11/10/21 213 lb 9.6 oz (96.9 kg)     Temp Readings from Last 3 Encounters:   01/11/23 98 °F (36.7 °C) (Temporal)   10/27/22 98.4 °F (36.9 °C) (Temporal)   11/10/21 97.7 °F (36.5 °C) (Temporal)     BP Readings from Last 3 Encounters:   01/11/23 128/83   10/27/22 133/66   11/10/21 109/69     Pulse Readings from Last 3 Encounters:   01/11/23 72   10/27/22 62   11/10/21 65           PHYSICAL EXAMINATION:       General: Alert, cooperative, no distress  Respiratory: Breathing comfortably, in no acute respiratory distress. Clear to auscultation bilaterally. Cardiovascular: Regular rate and rhythm, S1, S2 normal, no murmur, click, rub or gallop. Extremities: no edema. Abdomen: Soft, non-tender, not distended. Bowel sounds normal. No masses or organomegaly. MSK: Extremities normal appearing, atraumatic, no effusion. Gait steady and unassisted. Skin: Skin color, texture, turgor normal. No rashes or lesions on exposed skin. Neurologic: A/Ox3  Psychiatric: Normal affect. Mood euthymic. Thoughts logical. Speech volume and speed normal            Treatment risks/benefits/costs/interactions/alternatives discussed with patient. Advised patient to call back or return to office if symptoms worsen/change/persist. If patient cannot reach us or should anything more severe/urgent arise he/she should proceed directly to the nearest emergency department. Discussed expected course/resolution/complications of diagnosis in detail with patient. Patient expressed understanding with the diagnosis and plan. An electronic signature was used to authenticate this note.   -- Андрей Cohen NP

## 2023-01-11 NOTE — PROGRESS NOTES
Chief Complaint   Patient presents with    Abdominal Pain     Constant since having diarrhea Monday-Tuesday         1. \"Have you been to the ER, urgent care clinic since your last visit? Hospitalized since your last visit? \" No    2. \"Have you seen or consulted any other health care providers outside of the [address] [place] [address] since your last visit? \" No     3. For patients over 45: Has the patient had a colonoscopy? Yes - no Care Gap present     If the patient is female:    4. For patients over 40: Has the patient had a mammogram? Yes - no Care Gap present    5. For patients over 21: Has the patient had a pap smear?  Yes - no Care Gap present     3 most recent PHQ Screens 10/27/2022   Little interest or pleasure in doing things Not at all   Feeling down, depressed, irritable, or hopeless Not at all   Total Score PHQ 2 0       Health Maintenance Due   Topic Date Due    Flu Vaccine (1) 08/01/2022    Medicare Yearly Exam  Never done

## 2023-01-12 ENCOUNTER — TRANSCRIBE ORDER (OUTPATIENT)
Dept: SCHEDULING | Age: 68
End: 2023-01-12

## 2023-01-12 DIAGNOSIS — Z12.31 SCREENING MAMMOGRAM FOR HIGH-RISK PATIENT: Primary | ICD-10-CM

## 2023-01-12 LAB
ALBUMIN SERPL-MCNC: 3.4 G/DL (ref 3.5–5)
ALBUMIN/GLOB SERPL: 0.8 (ref 1.1–2.2)
ALP SERPL-CCNC: 124 U/L (ref 45–117)
ALT SERPL-CCNC: 24 U/L (ref 12–78)
AMYLASE SERPL-CCNC: 54 U/L (ref 25–115)
ANION GAP SERPL CALC-SCNC: 5 MMOL/L (ref 5–15)
AST SERPL-CCNC: 19 U/L (ref 15–37)
BASOPHILS # BLD: 0 K/UL (ref 0–0.1)
BASOPHILS NFR BLD: 1 % (ref 0–1)
BILIRUB SERPL-MCNC: 0.9 MG/DL (ref 0.2–1)
BUN SERPL-MCNC: 16 MG/DL (ref 6–20)
BUN/CREAT SERPL: 22 (ref 12–20)
CALCIUM SERPL-MCNC: 9.5 MG/DL (ref 8.5–10.1)
CHLORIDE SERPL-SCNC: 102 MMOL/L (ref 97–108)
CO2 SERPL-SCNC: 31 MMOL/L (ref 21–32)
CREAT SERPL-MCNC: 0.74 MG/DL (ref 0.55–1.02)
DIFFERENTIAL METHOD BLD: ABNORMAL
EOSINOPHIL # BLD: 0.1 K/UL (ref 0–0.4)
EOSINOPHIL NFR BLD: 3 % (ref 0–7)
ERYTHROCYTE [DISTWIDTH] IN BLOOD BY AUTOMATED COUNT: 13.7 % (ref 11.5–14.5)
GLOBULIN SER CALC-MCNC: 4.2 G/DL (ref 2–4)
GLUCOSE SERPL-MCNC: 95 MG/DL (ref 65–100)
HCT VFR BLD AUTO: 37.4 % (ref 35–47)
HGB BLD-MCNC: 12.9 G/DL (ref 11.5–16)
IMM GRANULOCYTES # BLD AUTO: 0 K/UL (ref 0–0.04)
IMM GRANULOCYTES NFR BLD AUTO: 0 % (ref 0–0.5)
LIPASE SERPL-CCNC: 108 U/L (ref 73–393)
LYMPHOCYTES # BLD: 1.2 K/UL (ref 0.8–3.5)
LYMPHOCYTES NFR BLD: 33 % (ref 12–49)
MCH RBC QN AUTO: 34.9 PG (ref 26–34)
MCHC RBC AUTO-ENTMCNC: 34.5 G/DL (ref 30–36.5)
MCV RBC AUTO: 101.1 FL (ref 80–99)
MONOCYTES # BLD: 0.3 K/UL (ref 0–1)
MONOCYTES NFR BLD: 8 % (ref 5–13)
NEUTS SEG # BLD: 2 K/UL (ref 1.8–8)
NEUTS SEG NFR BLD: 55 % (ref 32–75)
NRBC # BLD: 0 K/UL (ref 0–0.01)
NRBC BLD-RTO: 0 PER 100 WBC
PLATELET # BLD AUTO: 316 K/UL (ref 150–400)
PMV BLD AUTO: 11.6 FL (ref 8.9–12.9)
POTASSIUM SERPL-SCNC: 4.4 MMOL/L (ref 3.5–5.1)
PROT SERPL-MCNC: 7.6 G/DL (ref 6.4–8.2)
RBC # BLD AUTO: 3.7 M/UL (ref 3.8–5.2)
SODIUM SERPL-SCNC: 138 MMOL/L (ref 136–145)
WBC # BLD AUTO: 3.6 K/UL (ref 3.6–11)

## 2023-01-17 DIAGNOSIS — R19.7 DIARRHEA, UNSPECIFIED TYPE: Primary | ICD-10-CM

## 2023-02-02 ENCOUNTER — HOSPITAL ENCOUNTER (OUTPATIENT)
Dept: ULTRASOUND IMAGING | Age: 68
Discharge: HOME | End: 2023-02-02
Attending: NURSE PRACTITIONER
Payer: MEDICARE

## 2023-02-02 DIAGNOSIS — R10.84 GENERALIZED ABDOMINAL PAIN: ICD-10-CM

## 2023-02-02 DIAGNOSIS — R19.7 DIARRHEA, UNSPECIFIED TYPE: ICD-10-CM

## 2023-02-02 PROCEDURE — 76700 US EXAM ABDOM COMPLETE: CPT

## 2023-02-15 ENCOUNTER — OFFICE VISIT (OUTPATIENT)
Dept: FAMILY MEDICINE CLINIC | Age: 68
End: 2023-02-15
Payer: MEDICARE

## 2023-02-15 VITALS
OXYGEN SATURATION: 98 % | SYSTOLIC BLOOD PRESSURE: 125 MMHG | BODY MASS INDEX: 45.52 KG/M2 | WEIGHT: 225.8 LBS | TEMPERATURE: 97.8 F | RESPIRATION RATE: 16 BRPM | HEIGHT: 59 IN | HEART RATE: 65 BPM | DIASTOLIC BLOOD PRESSURE: 65 MMHG

## 2023-02-15 DIAGNOSIS — R19.7 DIARRHEA, UNSPECIFIED TYPE: ICD-10-CM

## 2023-02-15 DIAGNOSIS — Z00.00 WELCOME TO MEDICARE PREVENTIVE VISIT: Primary | ICD-10-CM

## 2023-02-15 DIAGNOSIS — E66.01 OBESITY, MORBID (HCC): ICD-10-CM

## 2023-02-15 DIAGNOSIS — E78.49 OTHER HYPERLIPIDEMIA: ICD-10-CM

## 2023-02-15 DIAGNOSIS — I10 ESSENTIAL HYPERTENSION: ICD-10-CM

## 2023-02-15 DIAGNOSIS — M79.645 THUMB PAIN, LEFT: ICD-10-CM

## 2023-02-15 DIAGNOSIS — Z71.89 ADVANCE CARE PLANNING: ICD-10-CM

## 2023-02-15 PROCEDURE — 99497 ADVNCD CARE PLAN 30 MIN: CPT | Performed by: NURSE PRACTITIONER

## 2023-02-15 PROCEDURE — G0438 PPPS, INITIAL VISIT: HCPCS | Performed by: NURSE PRACTITIONER

## 2023-02-15 PROCEDURE — G8432 DEP SCR NOT DOC, RNG: HCPCS | Performed by: NURSE PRACTITIONER

## 2023-02-15 PROCEDURE — 1123F ACP DISCUSS/DSCN MKR DOCD: CPT | Performed by: NURSE PRACTITIONER

## 2023-02-15 PROCEDURE — G8536 NO DOC ELDER MAL SCRN: HCPCS | Performed by: NURSE PRACTITIONER

## 2023-02-15 PROCEDURE — 1101F PT FALLS ASSESS-DOCD LE1/YR: CPT | Performed by: NURSE PRACTITIONER

## 2023-02-15 PROCEDURE — 3078F DIAST BP <80 MM HG: CPT | Performed by: NURSE PRACTITIONER

## 2023-02-15 PROCEDURE — G8399 PT W/DXA RESULTS DOCUMENT: HCPCS | Performed by: NURSE PRACTITIONER

## 2023-02-15 PROCEDURE — 3017F COLORECTAL CA SCREEN DOC REV: CPT | Performed by: NURSE PRACTITIONER

## 2023-02-15 PROCEDURE — 1090F PRES/ABSN URINE INCON ASSESS: CPT | Performed by: NURSE PRACTITIONER

## 2023-02-15 PROCEDURE — 3074F SYST BP LT 130 MM HG: CPT | Performed by: NURSE PRACTITIONER

## 2023-02-15 PROCEDURE — 93005 ELECTROCARDIOGRAM TRACING: CPT | Performed by: NURSE PRACTITIONER

## 2023-02-15 PROCEDURE — 99214 OFFICE O/P EST MOD 30 MIN: CPT | Performed by: NURSE PRACTITIONER

## 2023-02-15 PROCEDURE — G9899 SCRN MAM PERF RSLTS DOC: HCPCS | Performed by: NURSE PRACTITIONER

## 2023-02-15 PROCEDURE — 93010 ELECTROCARDIOGRAM REPORT: CPT | Performed by: NURSE PRACTITIONER

## 2023-02-15 PROCEDURE — G0463 HOSPITAL OUTPT CLINIC VISIT: HCPCS | Performed by: NURSE PRACTITIONER

## 2023-02-15 PROCEDURE — G8417 CALC BMI ABV UP PARAM F/U: HCPCS | Performed by: NURSE PRACTITIONER

## 2023-02-15 PROCEDURE — G8427 DOCREV CUR MEDS BY ELIG CLIN: HCPCS | Performed by: NURSE PRACTITIONER

## 2023-02-15 NOTE — PROGRESS NOTES
Chief Complaint   Patient presents with    Annual Wellness Visit       . 1. \"Have you been to the ER, urgent care clinic since your last visit? Hospitalized since your last visit? \" No    2. \"Have you seen or consulted any other health care providers outside of the [address] [place] [address] [place] since your last visit? \" No     3. For patients over 45: Has the patient had a colonoscopy? Yes - no Care Gap present     If the patient is female:    4. For patients over 40: Has the patient had a mammogram? Yes - Care Gap present. Most recent result on file    5. For patients over 21: Has the patient had a pap smear? Yes - no Care Gap present       3 most recent PHQ Screens 2/15/2023   Little interest or pleasure in doing things Not at all   Feeling down, depressed, irritable, or hopeless Not at all   Total Score PHQ 2 0       Abuse Screening Questionnaire 2/13/2023   Do you ever feel afraid of your partner? N   Are you in a relationship with someone who physically or mentally threatens you? N   Is it safe for you to go home? Y       ADL Assessment 2/15/2023   Feeding yourself No Help Needed   Getting from bed to chair No Help Needed   Getting dressed No Help Needed   Bathing or showering No Help Needed   Walk across the room (includes cane/walker) No Help Needed   Using the telphone No Help Needed   Taking your medications No Help Needed   Preparing meals No Help Needed   Managing money (expenses/bills) No Help Needed   Moderately strenuous housework (laundry) No Help Needed   Shopping for personal items (toiletries/medicines) No Help Needed   Shopping for groceries No Help Needed   Driving No Help Needed   Climbing a flight of stairs No Help Needed   Getting to places beyond walking distances No Help Needed       Fall Risk Assessment, last 12 mths 2/15/2023   Able to walk? Yes   Fall in past 12 months? 0   Do you feel unsteady?  0   Are you worried about falling 0         Health Maintenance Due   Topic Date Due    Medicare Yearly Exam  Never done

## 2023-02-15 NOTE — PROGRESS NOTES
[facility] Note     Elana Donahue (: 1955) is a 74 y.o. female, established patient, here for evaluation of the following chief complaint(s): Annual Wellness Visit       ASSESSMENT/PLAN:  1. Welcome to Medicare preventive visit  -     AMB POC EKG ROUTINE  LEADS, INTER & REP    2. Advance care planning  -     REFERRAL TO [facility]  -     ADVANCE CARE PLANNING FIRST 30 MINS    3. Thumb pain, left  -     REFERRAL TO ORTHOPEDICS    4. Diarrhea, unspecified type  - Resolved    5. Essential hypertension  - Well controlled on current regimen  - HCTZ 25mg daily  -Encouraged home blood pressure monitoring  - Next CMP 2023    6. Other hyperlipidemia  -Continue atorvastatin 20 mg  -Next lipid panel 2023    7. Obesity, morbid ([address]  - Diet and lifestyle modification encouraged for weight loss and chronic disease prevention/ management      Return in about 6 months (around 8/15/2023). SUBJECTIVE/OBJECTIVE:    Elana Donahue is a 74 y.o. female seen today for left thumb pain, welcome to medicare    Cardiovascular Review:  She has hyperlipidemia, HTN and obesity. Diet and Lifestyle: generally follows a low fat low cholesterol diet, exercises regularly, nonsmoker  Home BP Monitoring: is not measured at home. Pertinent ROS: taking medications as instructed, no medication side effects noted, no TIA's, no chest pain on exertion, no dyspnea on exertion, no swelling of ankles. Thumb:  Reports worsening left thumb pain with it getting stuck like a \"hinge\". She will often have to manually extend her thumb for it to move again. She has not been evaluated for this in the past.  Denies known injury. REVIEW OF SYSTEMS:    Review of Systems   Constitutional: Negative. HENT: Negative. Respiratory: Negative. Cardiovascular: Negative. Gastrointestinal: Negative. Genitourinary: Negative.     Musculoskeletal:  Positive for arthralgias (left thumb). Negative for back pain. Skin: Negative. Neurological: Negative. VITAL SIGNS:    Wt Readings from Last 3 Encounters:   02/15/23 225 lb 12.8 oz (102.4 kg)   01/11/23 225 lb 12.8 oz (102.4 kg)   10/27/22 217 lb (98.4 kg)     Temp Readings from Last 3 Encounters:   02/15/23 97.8 °F (36.6 °C) (Temporal)   01/11/23 98 °F (36.7 °C) (Temporal)   10/27/22 98.4 °F (36.9 °C) (Temporal)     BP Readings from Last 3 Encounters:   02/15/23 125/65   01/11/23 128/83   10/27/22 133/66     Pulse Readings from Last 3 Encounters:   02/15/23 65   01/11/23 72   10/27/22 62           PHYSICAL EXAMINATION:       General: Alert, cooperative, no distress  Respiratory: Breathing comfortably, in no acute respiratory distress. Clear to auscultation bilaterally. Cardiovascular: Regular rate and rhythm, S1, S2 normal, no murmur, click, rub or gallop. Extremities: no edema. Abdomen: Soft, non-tender, not distended. Bowel sounds normal. No masses or organomegaly. MSK: Extremities normal appearing, atraumatic, no effusion. Gait steady and unassisted. Skin: Skin color, texture, turgor normal. No rashes or lesions on exposed skin. Neurologic: A/Ox3  Psychiatric: Normal affect. Mood euthymic. Thoughts logical. Speech volume and speed normal            Treatment risks/benefits/costs/interactions/alternatives discussed with patient. Advised patient to call back or return to office if symptoms worsen/change/persist. If patient cannot reach us or should anything more severe/urgent arise he/she should proceed directly to the nearest emergency department. Discussed expected course/resolution/complications of diagnosis in detail with patient. Patient expressed understanding with the diagnosis and plan. An electronic signature was used to authenticate this note.   -- Harpreet Nieto NP

## 2023-02-15 NOTE — ACP (ADVANCE CARE PLANNING)
Brief ACP note -    Brief ACP note:  Introduced the topic of advancedcare planning with Natalieneda Dupont. Explored knowledge/understanding of advance directive/living will with patient. At this time there is no formal document in place. A copy of a blank advance directive form was provided to the patient for review. Inquired as who Natalie Dupont would want to make medical decisions on her behalf. Natalie Dupont identified   Primary Decision Maker: Kristin Gaines - Other Relative - [phone]    Secondary Decision Maker: Irais Paz - Other Relative - [phone] and will affirm this with them at a later date and time. At this time Natalie Dupont would like to review this document with family as well as discuss what their wishes may be with them. Natalie Dupont will contact our office with questions. Once this document is completed, reviewed and signed we will add to the medical record. Code status was reviewed and discussed.       Code: FULL CODE

## 2023-02-15 NOTE — PROGRESS NOTES
This is a \"Welcome to [employer]"  Initial Preventive Physical Examination (IPPE) providing Personalized Prevention Plan Services (Performed in the first 12 months of enrollment)    I have reviewed the patient's medical history in detail and updated the computerized patient record. Assessment/Plan   Education and counseling provided:  Are appropriate based on today's review and evaluation  Screening Mammography  Screening Pap and pelvic (covered once every 2 years)  Colorectal cancer screening tests  Bone mass measurement (DEXA)    1. Welcome to Medicare preventive visit  2. Advance care planning  3. Thumb pain, left       Depression Risk Screen     3 most recent PHQ Screens 2/15/2023   Little interest or pleasure in doing things Not at all   Feeling down, depressed, irritable, or hopeless Not at all   Total Score PHQ 2 0       Alcohol & Drug Abuse Risk Screen   Do you average more than 1 drink per night or more than 7 drinks a week?: (P) No  On any one occasion in the past three months have you had more than 3 drinks containing alcohol?: (P) No          Functional Ability and Level of Safety   Diet:  Diet: (P) No special diet   Hearing:  Hearing: (P) Patient reports hearing is good    Vision:  Vision: (P) good   Activities of Daily Living: The home contains: (P) no safety equipment  Functional ADLs: (P) Patient does total self care   Ambulation:  Patient ambulates: (P) with no difficulty   Exercise:  Exercise: (P) moderately active   Fall Risk Screen:  Fall Risk Assessment, last 12 mths 2/15/2023   Able to walk? Yes   Fall in past 12 months? 0   Do you feel unsteady?  0   Are you worried about falling 0     Abuse Screen:  Do you ever feel afraid of your partner?: (P) No  Are you in a relationship with someone who physically or mentally threatens you?: (P) No  Is it safe for you to go home?: (P) Yes      Screening EKG   EKG order placed: Yes    End of Life Planning   Advanced care planning directives were discussed with the patient and /or family/caregiver. Health Maintenance Due     Health Maintenance Due   Topic Date Due    Medicare Yearly Exam  Never done       Patient Care Team   Patient Care Team:  Brian Zapata NP as PCP - General (Nurse Practitioner)  Brian Zapata NP as PCP - [facility] Empaneled Provider    History     Past Medical History:   Diagnosis Date    Menopause     LMP-2001? Past Surgical History:   Procedure Laterality Date    HX MYOMECTOMY  2010    HX TONSILLECTOMY  1967     Current Outpatient Medications   Medication Sig Dispense Refill    atorvastatin (LIPITOR) 20 mg tablet Take 1 Tablet by mouth daily. 90 Tablet 3    hydroCHLOROthiazide (HYDRODIURIL) 25 mg tablet Take 1 Tablet by mouth daily. 90 Tablet 3    aspirin 81 mg chewable tablet Take 1 Tablet by mouth daily. 90 Tablet 3    cholecalciferol, vitamin D3, 50 mcg (2,000 unit) tab Take  by mouth.      multivitamin (ONE A DAY) tablet Take 1 Tab by mouth daily. Lactobacillus acidophilus (Probiotic) 10 billion cell cap Take 1 Capsule by mouth daily. 30 Capsule 1    ketoconazole (NIZORAL) 2 % topical cream Apply  to affected area daily. (Patient not taking: No sig reported) 60 g 2    nystatin (MYCOSTATIN) powder Apply  to affected area four (4) times daily. (Patient not taking: No sig reported) 1 Bottle 2     No Known Allergies    Family History   Problem Relation Age of Onset    Heart Disease Mother     No Known Problems Father     Hypertension Sister      Social History     Tobacco Use    Smoking status: Never    Smokeless tobacco: Never   Substance Use Topics    Alcohol use:  Yes     Alcohol/week: 2.0 standard drinks     Types: 1 Glasses of wine, 1 Cans of beer per week     Comment: occasinal       Haydee Cárdenas NP

## 2023-02-16 ENCOUNTER — PATIENT OUTREACH (OUTPATIENT)
Dept: CASE MANAGEMENT | Age: 68
End: 2023-02-16

## 2023-02-16 ENCOUNTER — OFFICE VISIT (OUTPATIENT)
Dept: ORTHOPEDIC SURGERY | Age: 68
End: 2023-02-16
Payer: MEDICARE

## 2023-02-16 VITALS — WEIGHT: 225 LBS | HEIGHT: 59 IN | BODY MASS INDEX: 45.36 KG/M2

## 2023-02-16 DIAGNOSIS — M65.312 TRIGGER FINGER OF LEFT THUMB: ICD-10-CM

## 2023-02-16 DIAGNOSIS — G89.29 CHRONIC PAIN OF LEFT THUMB: Primary | ICD-10-CM

## 2023-02-16 DIAGNOSIS — M79.645 CHRONIC PAIN OF LEFT THUMB: Primary | ICD-10-CM

## 2023-02-16 NOTE — ACP (ADVANCE CARE PLANNING)
Advance Care Planning   Ambulatory ACP Specialist Patient Outreach    Date:  2/16/2023    ACP Specialist:  Carol Rivers [address] call to patient in follow-up to ACP Specialist referral from:  Brian Zapata NP    [x] PCP  [] Provider   [] Ambulatory Care Management [] Other     For:                  [x] Advance Directive Assistance              [] Complete Portable DNR order              [] Complete POST/MOST              [] Code Status Discussion             [] Discuss Goals of Care             [] Early ACP Decision-Making              [] Other (Specify)    Date Referral Received:  2/15/2023    Today's Outreach:  [x] First   [] Second  [] Third       Third outreach made by: [] Phone  [] Email / mail    [] [employer]     Intervention:  [x] Spoke with Patient   [] Left VM requesting return call      Outcome:  Patient acknowledged receiving ACP information from physician's office but hasn't had time to fully review. Patient requested return outreach in one week to give her more time to review ACP information before scheduling a conversation with an ACP Specialist.  ACP information sheets sent to patient's confirmed email on file. Next Step:   [] ACP scheduled conversation  [x] Outreach again in one week               [] Email / Mail ACP Info Sheets  [] Email / Mail Advance Directive   [] Closing referral.  Routing closure to referring provider/staff and to ACP Specialist . [] Closure letter mailed to patient with invitation to contact ACP Specialist if / when ready.   Thank you for this referral.

## 2023-02-16 NOTE — PATIENT INSTRUCTIONS
From the [employer]    Trigger Finger    What is Trigger Finger? A trigger finger is a very common and treatable problem. It can occur in both fingers and the thumbs, which have tendons that help them to bend. The flexor tendons that bend the fingers have a lining on the outside. This lining is called tenosynovium. The tendon and lining are covered by a series of thick, soft tissue called pulleys. The tendon and its lining are designed to glide through the pulleys without friction. The pulleys are similar to how a line is held on a fishing perri (Figure 1). A trigger finger, sometimes referred to as a trigger thumb or stenosing tenosynovitis, can occur if one of three things happen: 1. The tendon enlarges (does not fit through pulley well); 2. The lining increases in thickness (does not fit through pulley well); 3. the pulley becomes thicker (the opening for the tendon gets smaller). The finger tendon and pulley system is designed to have the exact right sizes of each structure. The change in size of any of the important finger structures can cause problems. If the tendon becomes tight within the pulley, the lining gets squeezed and reacts with thickening. The bigger lining then produces more fluid. And the higher volume of fluid increases pressure. The undersurface of the pulley can also change and thicken. This thicker pulley causes friction on the moving tendon. This makes it difficult for the tendon to move back and forth (Figure 2). The good news is that trigger finger can be diagnosed by the history, symptoms, and a physical exam. It is rare to require other diagnostic testing. It is also helpful to know this problem has several very successful treatments. Figure 1        The pulley and tendon in a finger, gliding normally. Figure 2        As in trigger finger, if the pulley becomes too thick, the tendon cannot glide through it.   Causes  Trigger fingers are more common with certain medical conditions. Rheumatoid arthritis, gout and diabetes are risk factors for this condition. Repeated and strong gripping may lead to the condition. In most cases, the cause of the trigger finger is not known. Signs and Symptoms    Some symptoms of trigger finger can include:    Pain: Trigger finger may start with discomfort felt at the base of the affected finger or thumb, where the finger joins the palm. This may be the only initial symptom. This pain occurs with pressure over the A1 pulley area. The pain is often only present with activity such as gripping. When at rest, it may not hurt. Over time, if there is increased fluid production in the tendon sheath, this may cause pressure and pain even without hand use. Swelling: Over time there may be the development of a lump at the A1 pulley. This can be due to a nodular swelling within the tendon or the development of a fluid filled cyst. The cyst is called a flexor sheath ganglion. Stiffness or loss of motion: A trigger finger may result in loss of the ability to bend the finger. This can be estimated by how far the tip of the finger is from the palm of the hand when the patient is asked to bend the finger as much as they can. This is most common in chronic, untreated trigger fingers. It can be painful to try and bend the finger due to the compression of the fluid. Over time, the person may start to avoid a bent position of the finger to limit pain. Trigger fingers can also result in loss of the ability to straighten the finger. Some patients will feel pain trying to fully straighten. When the joint does not fully straighten for several weeks, a ligament called the volar plate becomes shortened and limits motion. Mechanical symptoms: A trigger finger can cause abnormal sensations or movement that are often described as popping, catching, or locking.  Sometimes these abnormal sensations occur while bending or straightening the finger, or both. Early on, the symptoms may be mildly painful, but as the tendon and pulley interaction becomes tighter, the pain can increase. Treatment    Non-Surgical    The goal of treatment in a trigger finger is to reduce or eliminate the swelling and catching/locking, allowing full, painless movement of the finger or thumb. The ability to restore the finger to what the patient believes is normal or 100% is easier when the problem is diagnosed and treated as soon as possible. Common treatment options include, but are not limited to:    Splinting at night. It is estimated that much of the bodys fluid volume pools in the legs during the day when we sit and stand due to the effects of gravity. When someone lays flat at night, the effect of gravity on the legs is more similar to the arms, so fluid may shift from the legs to the arms. This may increase swelling in the fingers where pain and locking can be more frequent at night and the early morning. By using a night splint to keep the finger straight, it can prevent painful locking during sleep. However, keeping the finger straight all night could result in the need to spend some time and effort getting it to move smoothly the next morning. Nonsteroidal anti-inflammatory drugs (NSAIDs). Many times, oral or topical anti-inflammatory medication (like ibuprofen or naproxen) can be tried to relieve pain and improve ability to move the finger through a large arc. Changing your activity. It may be possible to limit or space out the amount of time spent in forceful, repetitive, or sustained gripping. Steroid injection. Corticosteroid injections, also known as a cortisone shot, can be given at any stage of symptoms or duration. However, there may be better success when they are given early. Hand therapy. Patients may benefit from some supervised and home exercises.  It can be helpful to have a hand therapist teach concepts and techniques such as passive joint motion, tendon differential tendon gliding, proximal joint blocking to isolate more distal joints, edema control, and other treatments. Surgical    If non-surgical treatments do not relieve the symptoms, surgery may be recommended. The goal of surgery is to open the pulley at the base of the finger so that the tendon can glide more freely. The clicking or popping goes away in most cases after cutting the A1 pulley. If there are still mechanical symptoms after a trigger finger release, a flexor tenosynovectomy can be considered. This procedure removes the thickened lining from the surface of the tendons. If there are still mechanical symptoms, then part of the superficial tendon can be removed to reduce the volume of tendon moving in and out of the rest of the pulley system. It is optimal if all the above surgical treatments can be performed during the same procedure. With surgical treatment, the chances of recognizing and treating all changes to the finger is improved when it is possible for the patient to be awake at the end of the procedure to follow instructions. By having the patient able to actively bend and straighten their fingers several times, the surgeon can verify the mechanical symptoms are absent. Finger motion can return at different speeds depending on each patient and their unique timing of symptom development, when treatments begin, and the effectiveness of each type of treatment. Your orthopaedic hand surgeon will develop an individual treatment plan for you. There are different ways to perform the surgery. There are several different surgical techniques, anesthesia options, and locations where the procedure can occur. There can be some ongoing stiffness after hand surgery even if there is no more locking, and it may remain long-term. Therefore, hand therapy can be beneficial after surgery whether or not it was used before surgery.  There may be some mild to moderate tenderness at the surgery area for up to several months after surgery. However, most patients resume their normal lifestyles within a few weeks.

## 2023-02-17 NOTE — PROGRESS NOTES
Min Foster (: 1955) is a 74 y.o. female patient here for evaluation of the following chief complaint(s):  Thumb Pain (Left thumb pain)       ASSESSMENT/PLAN:  Below is the assessment and plan developed based on review of pertinent history, physical exam, labs, studies, and medications. 1. Chronic pain of left thumb  -     XR THUMB LT MIN 2 V; Future  2. Trigger finger of left thumb      74year old female with left trigger thumb. We discussed operative and non-operative management. Patient elected to proceed with surgery. Steroid injection also offered. We discussed risks, benefits and alternatives to surgery. After thorough discussion ultimately the patient elected to proceed with operative intervention. Risks including but not limited to postoperative pain, swelling, bruising, bleeding, scarring, infection, damage to neurovascular structures. Risk of permanent or temporary loss of range of motion, need for additional surgery, rejection of foreign material such as metal, suture or other tissue. We discussed risk of blood clots. Anesthesia risks which will fully be explained by the anesthesiologist however potentially include feeling sick or nauseous or vomiting after surgery. Sore throat, aches and pains including headache or back pain. Itching, awareness during surgery, damage to teeth, lips or tongue, damage to the eyes, allergic reactions, nerve damage. Plan for left trigger thumb release. Patient verbalized understanding and elected to proceed. All questions were answered to the patient's apparent satisfaction. SUBJECTIVE/OBJECTIVE:  HPI    74year old female with left thumb pain localized at MP joint. Reports thumb gets stuck and clicks at times. Patient reports a sudden onset of symptoms. Duration of problem 1 month.   Symptom Severity 6/10  Symptom Frequency intermittent        No Known Allergies    Current Outpatient Medications   Medication Sig    atorvastatin (LIPITOR) 20 mg tablet Take 1 Tablet by mouth daily. hydroCHLOROthiazide (HYDRODIURIL) 25 mg tablet Take 1 Tablet by mouth daily. aspirin 81 mg chewable tablet Take 1 Tablet by mouth daily. cholecalciferol, vitamin D3, 50 mcg (2,000 unit) tab Take  by mouth.    multivitamin (ONE A DAY) tablet Take 1 Tab by mouth daily. No current facility-administered medications for this visit. Social History     Socioeconomic History    Marital status: SINGLE     Spouse name: Not on file    Number of children: Not on file    Years of education: Not on file    Highest education level: Not on file   Occupational History    Not on file   Tobacco Use    Smoking status: Never    Smokeless tobacco: Never   Vaping Use    Vaping Use: Never used   Substance and Sexual Activity    Alcohol use: Yes     Alcohol/week: 2.0 standard drinks     Types: 1 Glasses of wine, 1 Cans of beer per week     Comment: occasional    Drug use: Never    Sexual activity: Not Currently   Other Topics Concern    Not on file   Social History Narrative    Not on file     Social Determinants of Health     Financial Resource Strain: Low Risk     Difficulty of Paying Living Expenses: Not hard at all   Food Insecurity: No Food Insecurity    Worried About Running Out of Food in the Last Year: Never true    Ran Out of Food in the Last Year: Never true   Transportation Needs: Not on file   Physical Activity: Not on file   Stress: Not on file   Social Connections: Not on file   Intimate Partner Violence: Not on file   Housing Stability: Not on file       Past Surgical History:   Procedure Laterality Date    HX CATARACT REMOVAL Bilateral 06/2022    HX MYOMECTOMY  2010    HX TONSILLECTOMY  1967       Family History   Problem Relation Age of Onset    Heart Disease Mother     No Known Problems Father     Hypertension Sister             Review of Systems    No flowsheet data found.         Vitals:  Ht 4' 11\" (1.499 m)   Wt 225 lb (102.1 kg)   BMI 45.44 kg/m² Estimated body surface area is 2.06 meters squared as calculated from the following:    Height as of this encounter: 4' 11\" (1.499 m). Weight as of this encounter: 225 lb (102.1 kg). Body mass index is 45.44 kg/m². Physical Exam    Musculoskeletal Exam:    Left  Hand Evaluation    Patient has tenderness to palpation over the A1 pulley of the thumb. Patient has catching, locking on examination. No other areas of tenderness to palpation are found. Mild TTP thumb CMC joint but much worse at MP. Patient fires AIN, PIN and ulnar nerves. Sensation is grossly intact in the median, radial and ulnar distribution. Hand is pink and appears well-perfused. Hand is warm. Skin is intact. Compartments are soft and compressible. Consitutional: Healthy  Skin:   - Edema - none  - Cellulitis - No    Neuro: Numbness or tingling in R/L arm: No    Psych: Affect normal    Cardiovascular: Capillary Refill < 2 seconds in upper extremities    Respiratory: Non-Labored Breathing    ROS:    Constitutional: Denies fever/chills    Respiratory: Denies SOB        Imaging:    XR Results (most recent):  Results from Appointment encounter on 02/16/23    XR THUMB LT MIN 2 V    Narrative  Left Thumb Xray  Indication: pain  Views: 3 views, AP/LAT/OBL    Interpretation: 3 views of the left thumb show evidence of mild arthritic changes at the [facility] joint. Additionally there is 50% subluxation of the [facility] joint. There are no other acute or chronic changes noted. There is no evidence of osteophyte formation. Orders Placed This Encounter    XR THUMB LT MIN 2 V     Standing Status:   Future     Number of Occurrences:   1     Standing Expiration Date:   2/17/2024        An electronic signature was used to authenticate this note.   -- Miriam Hernandez MD

## 2023-02-22 ENCOUNTER — PATIENT OUTREACH (OUTPATIENT)
Dept: CASE MANAGEMENT | Age: 68
End: 2023-02-22

## 2023-02-22 NOTE — ACP (ADVANCE CARE PLANNING)
Advance Care Planning   Ambulatory ACP Specialist Patient Outreach    Date:  2/22/2023    ACP Specialist:  Fidelina Palomo    Outreach call to patient in follow-up to ACP Specialist referral from:  French Uriostegui NP    [x] PCP  [] Provider   [] Ambulatory Care Management [] Other     For:                  [x] Advance Directive Assistance              [] Complete Portable DNR order              [] Complete POST/MOST              [] Code Status Discussion             [] Discuss Goals of Care             [] Early ACP Decision-Making              [] Other (Specify)    Date Referral Received:  2/15/2023    Today's Outreach:  [] First   [x] Second  [] Third       Third outreach made by: [] Phone  [] Email / mail    [] [employer]     Intervention:  [] Spoke with Patient   [x] Left VM requesting return call      Outcome:    Second outreach attempted to offer Pt conversation with ACP Specialist for advance care planning assistance - no answer. Left VM requesting return call. Next Step:   [] ACP scheduled conversation  [x] Outreach again in one week               [] Email / Mail ACP Info Sheets  [] Email / Mail Advance Directive   [] Closing referral.  Routing closure to referring provider/staff and to ACP Specialist . [] Closure letter mailed to patient with invitation to contact ACP Specialist if / when ready.   Thank you for this referral.

## 2023-03-02 ENCOUNTER — PATIENT OUTREACH (OUTPATIENT)
Dept: CASE MANAGEMENT | Age: 68
End: 2023-03-02

## 2023-03-02 NOTE — ACP (ADVANCE CARE PLANNING)
Advance Care Planning   Ambulatory ACP Specialist Patient Outreach    Date:  3/2/2023    ACP Specialist:  Yohana Seen    Outreach call to patient in follow-up to ACP Specialist referral from:  Jenniffer Mccall NP    [x] PCP  [] Provider   [] Ambulatory Care Management [] Other     For:                  [x] Advance Directive Assistance              [] Complete Portable DNR order              [] Complete POST/MOST              [] Code Status Discussion             [] Discuss Goals of Care             [] Early ACP Decision-Making              [] Other (Specify)    Date Referral Received:  2/15/2023    Today's Outreach:  [] First   [] Second  [x] Third       Third outreach made by: [] Phone  [x] Email / mail    [] [employer]     Intervention:  [] Spoke with Patient   [] Left VM requesting return call      Outcome:     No response received from patient to outreach attempts offering ACP assistance. Per ACP Program process, referral closure notification sent to patient's email on file with offer for future ACP assistance upon request (ACP Coordinator contact information included). Next Step:   [] ACP scheduled conversation  [] Outreach again in one week               [x] Email / Mail ACP Info Sheets  [] Email / Mail Advance Directive   [x] Closing referral.  Routing closure to referring provider/staff and to ACP Specialist . [x] Closure letter emailed to patient with invitation to contact ACP Specialist if / when ready.   Thank you for this referral.

## 2023-03-10 DIAGNOSIS — M65.312 TRIGGER FINGER OF LEFT THUMB: Primary | ICD-10-CM

## 2023-03-17 ENCOUNTER — HOSPITAL ENCOUNTER (OUTPATIENT)
Dept: MAMMOGRAPHY | Age: 68
Discharge: HOME | End: 2023-03-17
Attending: OBSTETRICS & GYNECOLOGY
Payer: MEDICARE

## 2023-03-17 DIAGNOSIS — Z12.31 SCREENING MAMMOGRAM FOR HIGH-RISK PATIENT: ICD-10-CM

## 2023-03-17 PROCEDURE — 77063 BREAST TOMOSYNTHESIS BI: CPT

## 2023-03-28 DIAGNOSIS — M65.312 TRIGGER FINGER OF LEFT THUMB: Primary | ICD-10-CM

## 2023-03-28 RX ORDER — HYDROCODONE BITARTRATE AND ACETAMINOPHEN 5; 325 MG/1; MG/1
1 TABLET ORAL
Qty: 10 TABLET | Refills: 0 | Status: SHIPPED | OUTPATIENT
Start: 2023-03-28 | End: 2023-04-02

## 2023-05-18 RX ORDER — ASPIRIN 81 MG/1
81 TABLET, CHEWABLE ORAL DAILY
COMMUNITY
Start: 2022-10-27

## 2023-05-18 RX ORDER — HYDROCHLOROTHIAZIDE 25 MG/1
25 TABLET ORAL DAILY
COMMUNITY
Start: 2022-10-27

## 2023-05-18 RX ORDER — ATORVASTATIN CALCIUM 20 MG/1
20 TABLET, FILM COATED ORAL DAILY
COMMUNITY
Start: 2022-10-27

## 2023-09-20 ENCOUNTER — TELEPHONE (OUTPATIENT)
Age: 68
End: 2023-09-20

## 2023-09-20 NOTE — TELEPHONE ENCOUNTER
----- Message from Kamaljit White sent at 9/19/2023  4:59 PM EDT -----  Subject: Appointment Request    Reason for Call: Established Patient Appointment needed: Routine Medicare   AWV    QUESTIONS    Reason for appointment request? Available appointments did not meet   patient need     Additional Information for Provider?  Patient needs her Medicare AWV and a   physical for her employer and flu shot.  ---------------------------------------------------------------------------  --------------  Smitha Ralph Theresa  [phone]; OK to leave message on voicemail  ---------------------------------------------------------------------------  --------------  SCRIPT ANSWERS

## 2023-09-20 NOTE — TELEPHONE ENCOUNTER
Informed pt that her last AMW was on 2.15.23. Pt stated that she needed a medication follow-up,pt's scheudled to see KRISHNA Dawson on  10.19.23 @ 10:45 AM. Greater than 75%

## 2023-10-19 ENCOUNTER — OFFICE VISIT (OUTPATIENT)
Age: 68
End: 2023-10-19
Payer: MEDICARE

## 2023-10-19 VITALS
TEMPERATURE: 97.8 F | HEIGHT: 59 IN | RESPIRATION RATE: 16 BRPM | HEART RATE: 56 BPM | OXYGEN SATURATION: 100 % | BODY MASS INDEX: 44.27 KG/M2 | SYSTOLIC BLOOD PRESSURE: 138 MMHG | DIASTOLIC BLOOD PRESSURE: 73 MMHG | WEIGHT: 219.6 LBS

## 2023-10-19 DIAGNOSIS — Z23 NEEDS FLU SHOT: ICD-10-CM

## 2023-10-19 DIAGNOSIS — R73.01 IMPAIRED FASTING GLUCOSE: ICD-10-CM

## 2023-10-19 DIAGNOSIS — I10 ESSENTIAL HYPERTENSION: Primary | ICD-10-CM

## 2023-10-19 DIAGNOSIS — E78.49 OTHER HYPERLIPIDEMIA: ICD-10-CM

## 2023-10-19 PROCEDURE — PBSHW INFLUENZA, FLUAD, (AGE 65 Y+), IM, PF, 0.5 ML: Performed by: NURSE PRACTITIONER

## 2023-10-19 PROCEDURE — 90694 VACC AIIV4 NO PRSRV 0.5ML IM: CPT | Performed by: NURSE PRACTITIONER

## 2023-10-19 PROCEDURE — G8484 FLU IMMUNIZE NO ADMIN: HCPCS | Performed by: NURSE PRACTITIONER

## 2023-10-19 PROCEDURE — G8400 PT W/DXA NO RESULTS DOC: HCPCS | Performed by: NURSE PRACTITIONER

## 2023-10-19 PROCEDURE — 1123F ACP DISCUSS/DSCN MKR DOCD: CPT | Performed by: NURSE PRACTITIONER

## 2023-10-19 PROCEDURE — 99214 OFFICE O/P EST MOD 30 MIN: CPT | Performed by: NURSE PRACTITIONER

## 2023-10-19 PROCEDURE — 3017F COLORECTAL CA SCREEN DOC REV: CPT | Performed by: NURSE PRACTITIONER

## 2023-10-19 PROCEDURE — 1036F TOBACCO NON-USER: CPT | Performed by: NURSE PRACTITIONER

## 2023-10-19 PROCEDURE — G8427 DOCREV CUR MEDS BY ELIG CLIN: HCPCS | Performed by: NURSE PRACTITIONER

## 2023-10-19 PROCEDURE — 3078F DIAST BP <80 MM HG: CPT | Performed by: NURSE PRACTITIONER

## 2023-10-19 PROCEDURE — 1090F PRES/ABSN URINE INCON ASSESS: CPT | Performed by: NURSE PRACTITIONER

## 2023-10-19 PROCEDURE — G8417 CALC BMI ABV UP PARAM F/U: HCPCS | Performed by: NURSE PRACTITIONER

## 2023-10-19 PROCEDURE — 3075F SYST BP GE 130 - 139MM HG: CPT | Performed by: NURSE PRACTITIONER

## 2023-10-19 RX ORDER — ASPIRIN 81 MG/1
81 TABLET, CHEWABLE ORAL DAILY
Qty: 90 TABLET | Refills: 1 | Status: SHIPPED | OUTPATIENT
Start: 2023-10-19

## 2023-10-19 RX ORDER — HYDROCHLOROTHIAZIDE 25 MG/1
25 TABLET ORAL DAILY
Qty: 90 TABLET | Refills: 1 | Status: SHIPPED | OUTPATIENT
Start: 2023-10-19

## 2023-10-19 RX ORDER — ATORVASTATIN CALCIUM 20 MG/1
20 TABLET, FILM COATED ORAL DAILY
Qty: 90 TABLET | Refills: 1 | Status: SHIPPED | OUTPATIENT
Start: 2023-10-19

## 2023-10-19 SDOH — ECONOMIC STABILITY: HOUSING INSECURITY
IN THE LAST 12 MONTHS, WAS THERE A TIME WHEN YOU DID NOT HAVE A STEADY PLACE TO SLEEP OR SLEPT IN A SHELTER (INCLUDING NOW)?: NO

## 2023-10-19 SDOH — ECONOMIC STABILITY: FOOD INSECURITY: WITHIN THE PAST 12 MONTHS, THE FOOD YOU BOUGHT JUST DIDN'T LAST AND YOU DIDN'T HAVE MONEY TO GET MORE.: NEVER TRUE

## 2023-10-19 SDOH — ECONOMIC STABILITY: INCOME INSECURITY: HOW HARD IS IT FOR YOU TO PAY FOR THE VERY BASICS LIKE FOOD, HOUSING, MEDICAL CARE, AND HEATING?: NOT HARD AT ALL

## 2023-10-19 SDOH — ECONOMIC STABILITY: FOOD INSECURITY: WITHIN THE PAST 12 MONTHS, YOU WORRIED THAT YOUR FOOD WOULD RUN OUT BEFORE YOU GOT MONEY TO BUY MORE.: NEVER TRUE

## 2023-10-20 LAB
ALBUMIN SERPL-MCNC: 3.6 G/DL (ref 3.5–5)
ALBUMIN/GLOB SERPL: 0.9 (ref 1.1–2.2)
ALP SERPL-CCNC: 131 U/L (ref 45–117)
ALT SERPL-CCNC: 29 U/L (ref 12–78)
ANION GAP SERPL CALC-SCNC: 5 MMOL/L (ref 5–15)
AST SERPL-CCNC: 17 U/L (ref 15–37)
BILIRUB SERPL-MCNC: 0.8 MG/DL (ref 0.2–1)
BUN SERPL-MCNC: 21 MG/DL (ref 6–20)
BUN/CREAT SERPL: 24 (ref 12–20)
CALCIUM SERPL-MCNC: 10.6 MG/DL (ref 8.5–10.1)
CHLORIDE SERPL-SCNC: 107 MMOL/L (ref 97–108)
CHOLEST SERPL-MCNC: 155 MG/DL
CO2 SERPL-SCNC: 34 MMOL/L (ref 21–32)
CREAT SERPL-MCNC: 0.86 MG/DL (ref 0.55–1.02)
EST. AVERAGE GLUCOSE BLD GHB EST-MCNC: 117 MG/DL
GLOBULIN SER CALC-MCNC: 4 G/DL (ref 2–4)
GLUCOSE SERPL-MCNC: 103 MG/DL (ref 65–100)
HBA1C MFR BLD: 5.7 % (ref 4–5.6)
HDLC SERPL-MCNC: 89 MG/DL
HDLC SERPL: 1.7 (ref 0–5)
LDLC SERPL CALC-MCNC: 55.8 MG/DL (ref 0–100)
POTASSIUM SERPL-SCNC: 5.4 MMOL/L (ref 3.5–5.1)
PROT SERPL-MCNC: 7.6 G/DL (ref 6.4–8.2)
SODIUM SERPL-SCNC: 146 MMOL/L (ref 136–145)
TRIGL SERPL-MCNC: 51 MG/DL
VLDLC SERPL CALC-MCNC: 10.2 MG/DL

## 2023-10-24 DIAGNOSIS — E87.0 HYPERNATREMIA: ICD-10-CM

## 2023-10-24 DIAGNOSIS — E87.5 HYPERKALEMIA: Primary | ICD-10-CM

## 2023-10-27 ENCOUNTER — NURSE ONLY (OUTPATIENT)
Age: 68
End: 2023-10-27

## 2023-10-27 DIAGNOSIS — E87.0 HYPERNATREMIA: ICD-10-CM

## 2023-10-27 DIAGNOSIS — E87.5 HYPERKALEMIA: ICD-10-CM

## 2023-10-28 LAB
ANION GAP SERPL CALC-SCNC: 4 MMOL/L (ref 5–15)
BUN SERPL-MCNC: 16 MG/DL (ref 6–20)
BUN/CREAT SERPL: 23 (ref 12–20)
CALCIUM SERPL-MCNC: 9.3 MG/DL (ref 8.5–10.1)
CHLORIDE SERPL-SCNC: 103 MMOL/L (ref 97–108)
CO2 SERPL-SCNC: 31 MMOL/L (ref 21–32)
CREAT SERPL-MCNC: 0.7 MG/DL (ref 0.55–1.02)
GLUCOSE SERPL-MCNC: 91 MG/DL (ref 65–100)
POTASSIUM SERPL-SCNC: 4.2 MMOL/L (ref 3.5–5.1)
SODIUM SERPL-SCNC: 138 MMOL/L (ref 136–145)

## 2024-04-13 DIAGNOSIS — E78.49 OTHER HYPERLIPIDEMIA: ICD-10-CM

## 2024-04-13 DIAGNOSIS — I10 ESSENTIAL HYPERTENSION: ICD-10-CM

## 2024-04-15 RX ORDER — HYDROCHLOROTHIAZIDE 25 MG/1
25 TABLET ORAL DAILY
Qty: 90 TABLET | Refills: 1 | Status: SHIPPED | OUTPATIENT
Start: 2024-04-15

## 2024-04-15 RX ORDER — ATORVASTATIN CALCIUM 20 MG/1
20 TABLET, FILM COATED ORAL DAILY
Qty: 90 TABLET | Refills: 1 | Status: SHIPPED | OUTPATIENT
Start: 2024-04-15

## 2024-04-15 RX ORDER — ASPIRIN 81 MG
81 TABLET,CHEWABLE ORAL DAILY
Qty: 90 TABLET | Refills: 1 | Status: SHIPPED | OUTPATIENT
Start: 2024-04-15

## 2024-04-15 NOTE — TELEPHONE ENCOUNTER
PCP: Caridad Dawson, APRN - NP    Last appt: 10/19/2023   No future appointments.    Requested Prescriptions     Pending Prescriptions Disp Refills    ASPIRIN LOW DOSE 81 MG chewable tablet [Pharmacy Med Name: ASPIRIN 81 MG CHEWABLE TABLET] 90 tablet 1     Sig: TAKE 1 TABLET BY MOUTH EVERY DAY    atorvastatin (LIPITOR) 20 MG tablet [Pharmacy Med Name: ATORVASTATIN 20 MG TABLET] 90 tablet 1     Sig: TAKE 1 TABLET BY MOUTH EVERY DAY    hydroCHLOROthiazide (HYDRODIURIL) 25 MG tablet [Pharmacy Med Name: HYDROCHLOROTHIAZIDE 25 MG TAB] 90 tablet 1     Sig: TAKE 1 TABLET BY MOUTH EVERY DAY         Prior labs and Blood pressures:  BP Readings from Last 3 Encounters:   10/19/23 138/73   02/15/23 125/65   01/11/23 128/83     Lab Results   Component Value Date/Time     10/27/2023 08:40 AM    K 4.2 10/27/2023 08:40 AM     10/27/2023 08:40 AM    CO2 31 10/27/2023 08:40 AM    BUN 16 10/27/2023 08:40 AM    GFRAA >60 11/11/2021 10:31 AM     No results found for: \"HBA1C\", \"[id number]\"  Lab Results   Component Value Date/Time    CHOL 155 10/19/2023 11:40 AM    HDL 89 10/19/2023 11:40 AM    VLDL 14 10/28/2020 10:43 AM     No results found for: \"VITD3\", \"VD3RIA\"    Lab Results   Component Value Date/Time    TSH 1.42 10/27/2022 09:41 AM

## 2024-10-21 NOTE — TELEPHONE ENCOUNTER
PCP: Caridad Dawson, APRN - NP    Last appt: 10/19/2023   No future appointments.    Requested Prescriptions     Pending Prescriptions Disp Refills    ASPIRIN LOW DOSE 81 MG chewable tablet [Pharmacy Med Name: ASPIRIN 81 MG CHEWABLE TABLET] 90 tablet 1     Sig: TAKE 1 TABLET BY MOUTH EVERY DAY         Prior labs and Blood pressures:  BP Readings from Last 3 Encounters:   10/19/23 138/73   02/15/23 125/65   01/11/23 128/83     Lab Results   Component Value Date/Time     10/27/2023 08:40 AM    K 4.2 10/27/2023 08:40 AM     10/27/2023 08:40 AM    CO2 31 10/27/2023 08:40 AM    BUN 16 10/27/2023 08:40 AM    GFRAA >60 11/11/2021 10:31 AM     No results found for: \"HBA1C\", \"[id number]\"  Lab Results   Component Value Date/Time    CHOL 155 10/19/2023 11:40 AM    HDL 89 10/19/2023 11:40 AM    LDL 55.8 10/19/2023 11:40 AM    VLDL 10.2 10/19/2023 11:40 AM    VLDL 14 10/28/2020 10:43 AM     No results found for: \"VITD3\"    Lab Results   Component Value Date/Time    TSH 1.42 10/27/2022 09:41 AM

## 2024-10-22 NOTE — TELEPHONE ENCOUNTER
Sent pt a my chart message with the self scheduling ticket informing her that her medication will not be approved,until she make's an In office Appt with KRISHNA Dawson.

## 2024-10-23 NOTE — TELEPHONE ENCOUNTER
Left pt a message to callback.Before pt's Aspirin 81 mg prescription can be filled pt need's to make an In office Appt for a HTN,Hyperlipidemia and Medication Follow-up.

## 2024-10-25 RX ORDER — ASPIRIN 81 MG
81 TABLET,CHEWABLE ORAL DAILY
Qty: 90 TABLET | Refills: 1 | OUTPATIENT
Start: 2024-10-25

## 2024-10-25 NOTE — TELEPHONE ENCOUNTER
I have left this pt several call's as well as a my chart message.Pt has yet to contact the office to make an appt.